# Patient Record
Sex: FEMALE | Race: WHITE | NOT HISPANIC OR LATINO | Employment: UNEMPLOYED | URBAN - METROPOLITAN AREA
[De-identification: names, ages, dates, MRNs, and addresses within clinical notes are randomized per-mention and may not be internally consistent; named-entity substitution may affect disease eponyms.]

---

## 2017-07-31 ENCOUNTER — TELEPHONE (OUTPATIENT)
Dept: NEUROLOGY | Facility: HOSPITAL | Age: 62
End: 2017-07-31

## 2017-07-31 NOTE — TELEPHONE ENCOUNTER
Pt's  request earlier appt.  Appt rescheduled to Monday, August 14, 2017 at 130pm.  Caregiver acknowledged understanding.

## 2017-08-14 ENCOUNTER — OFFICE VISIT (OUTPATIENT)
Dept: NEUROLOGY | Facility: HOSPITAL | Age: 62
End: 2017-08-14
Attending: INTERNAL MEDICINE
Payer: MEDICARE

## 2017-08-14 VITALS
SYSTOLIC BLOOD PRESSURE: 108 MMHG | HEIGHT: 62 IN | RESPIRATION RATE: 16 BRPM | HEART RATE: 85 BPM | TEMPERATURE: 98 F | BODY MASS INDEX: 19.32 KG/M2 | DIASTOLIC BLOOD PRESSURE: 77 MMHG | WEIGHT: 105 LBS

## 2017-08-14 DIAGNOSIS — D50.0 ANEMIA DUE TO CHRONIC BLOOD LOSS: Primary | ICD-10-CM

## 2017-08-14 PROCEDURE — 99214 OFFICE O/P EST MOD 30 MIN: CPT | Performed by: INTERNAL MEDICINE

## 2017-08-14 RX ORDER — TIZANIDINE 4 MG/1
4 TABLET ORAL EVERY 6 HOURS PRN
Status: ON HOLD | COMMUNITY
End: 2019-10-17 | Stop reason: CLARIF

## 2017-08-14 RX ORDER — BUTALBITAL, ACETAMINOPHEN AND CAFFEINE 50; 325; 40 MG/1; MG/1; MG/1
1 CAPSULE ORAL 3 TIMES DAILY
COMMUNITY

## 2017-08-14 RX ORDER — VENLAFAXINE 75 MG/1
75 TABLET ORAL DAILY
Status: ON HOLD | COMMUNITY
End: 2020-11-16 | Stop reason: CLARIF

## 2017-08-14 RX ORDER — PROMETHAZINE HYDROCHLORIDE 25 MG/1
25 TABLET ORAL EVERY 6 HOURS PRN
COMMUNITY

## 2017-08-14 RX ORDER — OXYCODONE AND ACETAMINOPHEN 10; 325 MG/1; MG/1
1 TABLET ORAL EVERY 4 HOURS PRN
Status: ON HOLD | COMMUNITY
End: 2019-10-17 | Stop reason: CLARIF

## 2017-08-14 RX ORDER — CLONAZEPAM 1 MG/1
1 TABLET ORAL 2 TIMES DAILY
Status: ON HOLD | COMMUNITY
End: 2019-10-17 | Stop reason: CLARIF

## 2017-08-14 RX ORDER — PANTOPRAZOLE SODIUM 40 MG/1
40 TABLET, DELAYED RELEASE ORAL DAILY
Status: ON HOLD | COMMUNITY
End: 2019-10-17 | Stop reason: CLARIF

## 2017-08-14 NOTE — PATIENT INSTRUCTIONS
You are scheduled for an upper single balloon test on _Thursday, August 24, 2017_____    You should eat light meals the day before the procedure and nothing to eat or drink after midnight the night before your procedure.    You will need to be at the 1st floor admission desk at the hospital on __and the hospital will call you the night before to tell you when to report for the procedure_____

## 2017-08-14 NOTE — PROGRESS NOTES
"[facility]    CC: anemia     HPI 62 y.o. female with history of mild depression and chronic H/A presents as a referral from Dr. Simeon for a >6 month history of recurrent, severe BRITTANY associated with classic angioectasias ablated in the stomach and duodenum. She denies associated abdominal pain but complains of a 10 lb wt loss during this time period. She is taking iron daily. She has no family history of colon cancer or IBD.     Past Medical History:   Mild depression   Chronic H/A   Iron deficiency anemia     Social History: no MERE abuse   Family History: negative for IBD or CRC     Review of Systems  General ROS: negative for chills, fever; positive weight loss  Psychological ROS: negative for hallucination; positive depression   Ophthalmic ROS: negative for blurry vision, photophobia or eye pain  ENT ROS: negative for epistaxis, sore throat or rhinorrhea  Respiratory ROS: no cough, shortness of breath, or wheezing  Cardiovascular ROS: no chest pain or dyspnea on exertion  Gastrointestinal ROS: no abdominal pain, change in bowel habits, or black/ bloody stools  Genito-Urinary ROS: no dysuria, trouble voiding, or hematuria  Musculoskeletal ROS: negative for gait disturbance or muscular weakness  Neurological ROS: no syncope or seizures; no ataxia  Dermatological ROS: negative for pruritis, rash and jaundice    Physical Examination  /77 (BP Location: Left arm)   Pulse 85   Temp 97.5 °F (36.4 °C) (Oral)   Resp 16   Ht 5' 2" (1.575 m)   Wt 47.6 kg (105 lb)   BMI 19.20 kg/m²   General appearance: alert, cooperative, no distress  HENT: Normocephalic, atraumatic, neck symmetrical, no nasal discharge   Eyes: conjunctivae/corneas clear, PERRL, EOM's intact  Lungs: clear to auscultation bilaterally, no dullness to percussion bilaterally  Heart: regular rate and rhythm without rub; no displacement of the PMI   Abdomen: soft, non-tender; bowel sounds normoactive; no organomegaly  Extremities: extremities " symmetric; no clubbing, cyanosis, or edema  Integument: Skin color, texture, turgor normal; no rashes; hair distrubution normal  Neurologic: Alert and oriented X 3, normal strength, normal coordination and gait  Psychiatric: no pressured speech; normal affect; no evidence of impaired cognition     Labs:  Lab Results   Component Value Date    WBC 7.85 04/20/2009    HGB 12.6 04/20/2009    HCT 39.3 04/20/2009    MCV 99.2 (H) 04/20/2009     04/20/2009     Previous endoscopic images reviewed   Additional history obtained from family     Assessment:   62yrF with a recent diagnosis of BRITTANY associated with classic angioectasias in the duodenum and stomach.     Plan:   Upper single balloon enteroscopy for ablation of any additional angioectasias in the proximal bowel   Continue oral iron once daily indefinitely   Patient referred by Dr. Cailin Hagen MD   [address] [place] [address], [address]   [place] [address] [phone]

## 2017-08-15 ENCOUNTER — TELEPHONE (OUTPATIENT)
Dept: NEUROLOGY | Facility: HOSPITAL | Age: 62
End: 2017-08-15

## 2017-08-15 NOTE — TELEPHONE ENCOUNTER
----- Message from Nina Kaur RN sent at 8/14/2017  4:35 PM CDT -----  Myles Taveras,  Can you please get auth for this patient to get a upper single balloon on 8/24/17 for DX:D50.0 and CPT code 27516.  Thanks,  Elvia

## 2017-08-24 ENCOUNTER — SURGERY (OUTPATIENT)
Age: 62
End: 2017-08-24

## 2017-08-24 ENCOUNTER — HOSPITAL ENCOUNTER (OUTPATIENT)
Facility: HOSPITAL | Age: 62
Discharge: HOME | End: 2017-08-24
Attending: INTERNAL MEDICINE | Admitting: INTERNAL MEDICINE
Payer: MEDICARE

## 2017-08-24 ENCOUNTER — ANESTHESIA (OUTPATIENT)
Dept: ENDOSCOPY | Facility: HOSPITAL | Age: 62
End: 2017-08-24
Payer: MEDICARE

## 2017-08-24 ENCOUNTER — ANESTHESIA EVENT (OUTPATIENT)
Dept: ENDOSCOPY | Facility: HOSPITAL | Age: 62
End: 2017-08-24
Payer: MEDICARE

## 2017-08-24 DIAGNOSIS — D50.0 IRON DEFICIENCY ANEMIA DUE TO CHRONIC BLOOD LOSS: Primary | ICD-10-CM

## 2017-08-24 DIAGNOSIS — D64.9 ANEMIA: ICD-10-CM

## 2017-08-24 PROCEDURE — 27201348 HC SBE SINGLE BALLOON: Performed by: INTERNAL MEDICINE

## 2017-08-24 PROCEDURE — 27201238 HC BALLOON, OVERTUBE (ANY): Performed by: INTERNAL MEDICINE

## 2017-08-24 PROCEDURE — 25000003 PHARM REV CODE 250: Performed by: NURSE ANESTHETIST, CERTIFIED REGISTERED

## 2017-08-24 PROCEDURE — 44378 SMALL BOWEL ENDOSCOPY: CPT | Performed by: INTERNAL MEDICINE

## 2017-08-24 PROCEDURE — 25000003 PHARM REV CODE 250: Performed by: INTERNAL MEDICINE

## 2017-08-24 PROCEDURE — 37000009 HC ANESTHESIA EA ADD 15 MINS: Performed by: INTERNAL MEDICINE

## 2017-08-24 PROCEDURE — 27202087 HC PROBE, APC: Performed by: INTERNAL MEDICINE

## 2017-08-24 PROCEDURE — 63600175 PHARM REV CODE 636 W HCPCS: Performed by: NURSE ANESTHETIST, CERTIFIED REGISTERED

## 2017-08-24 PROCEDURE — 37000008 HC ANESTHESIA 1ST 15 MINUTES: Performed by: INTERNAL MEDICINE

## 2017-08-24 RX ORDER — SODIUM CHLORIDE 9 MG/ML
INJECTION, SOLUTION INTRAVENOUS CONTINUOUS
Status: DISCONTINUED | OUTPATIENT
Start: 2017-08-24 | End: 2017-08-24 | Stop reason: HOSPADM

## 2017-08-24 RX ORDER — PROPOFOL 10 MG/ML
VIAL (ML) INTRAVENOUS CONTINUOUS PRN
Status: DISCONTINUED | OUTPATIENT
Start: 2017-08-24 | End: 2017-08-24

## 2017-08-24 RX ORDER — PROPOFOL 10 MG/ML
VIAL (ML) INTRAVENOUS
Status: DISCONTINUED | OUTPATIENT
Start: 2017-08-24 | End: 2017-08-24

## 2017-08-24 RX ORDER — GLUCAGON 1 MG
KIT INJECTION
Status: DISCONTINUED | OUTPATIENT
Start: 2017-08-24 | End: 2017-08-24

## 2017-08-24 RX ORDER — LIDOCAINE HCL/PF 100 MG/5ML
SYRINGE (ML) INTRAVENOUS
Status: DISCONTINUED | OUTPATIENT
Start: 2017-08-24 | End: 2017-08-24

## 2017-08-24 RX ORDER — FENTANYL CITRATE 50 UG/ML
INJECTION, SOLUTION INTRAMUSCULAR; INTRAVENOUS
Status: DISCONTINUED | OUTPATIENT
Start: 2017-08-24 | End: 2017-08-24

## 2017-08-24 RX ADMIN — TOPICAL ANESTHETIC 1 EACH: 200 SPRAY DENTAL; PERIODONTAL at 08:08

## 2017-08-24 RX ADMIN — SODIUM CHLORIDE: 9 INJECTION, SOLUTION INTRAVENOUS at 07:08

## 2017-08-24 RX ADMIN — PROPOFOL 20 MG: 10 INJECTION, EMULSION INTRAVENOUS at 08:08

## 2017-08-24 RX ADMIN — PROPOFOL 150 MCG/KG/MIN: 10 INJECTION, EMULSION INTRAVENOUS at 08:08

## 2017-08-24 RX ADMIN — FENTANYL CITRATE 25 MCG: 50 INJECTION, SOLUTION INTRAMUSCULAR; INTRAVENOUS at 08:08

## 2017-08-24 RX ADMIN — GLUCAGON HYDROCHLORIDE 1 MG: KIT at 08:08

## 2017-08-24 RX ADMIN — LIDOCAINE HYDROCHLORIDE 50 MG: 20 INJECTION, SOLUTION INTRAVENOUS at 08:08

## 2017-08-24 NOTE — ANESTHESIA POSTPROCEDURE EVALUATION
"Anesthesia Post Evaluation    Patient: Gretchen Ahmadi    Procedure(s) Performed: Procedure(s) (LRB):  Upper single balloon (N/A)    Final Anesthesia Type: MAC  Patient location during evaluation: GI PACU  Patient participation: Yes- Able to Participate  Level of consciousness: awake and alert and oriented  Post-procedure vital signs: reviewed and stable  Pain management: adequate  Airway patency: patent  PONV status at discharge: No PONV  Anesthetic complications: no      Cardiovascular status: blood pressure returned to baseline and hemodynamically stable  Respiratory status: unassisted, spontaneous ventilation and room air  Hydration status: euvolemic  Follow-up not needed.        Visit Vitals  /79 (BP Location: Right arm, Patient Position: Lying)   Pulse 96   Temp 36.8 °C (98.2 °F) (Oral)   Resp 18   Ht 5' 2" (1.575 m)   Wt 47.6 kg (105 lb)   SpO2 95%   Breastfeeding? No   BMI 19.20 kg/m²       Pain/Tyrese Score: Pain Assessment Performed: Yes (8/24/2017  7:42 AM)  Presence of Pain: complains of pain/discomfort (8/24/2017  7:42 AM)      "

## 2017-08-24 NOTE — TRANSFER OF CARE
"Anesthesia Transfer of Care Note    Patient: Gretchen Ahmadi    Procedure(s) Performed: Procedure(s) (LRB):  Upper single balloon (N/A)    Patient location: GI    Anesthesia Type: MAC    Transport from OR: Transported from OR on room air with adequate spontaneous ventilation    Post pain: adequate analgesia    Post assessment: no apparent anesthetic complications    Post vital signs: stable    Level of consciousness: awake    Nausea/Vomiting: no nausea/vomiting    Complications: none    Transfer of care protocol was followed      Last vitals:   Visit Vitals  /79 (BP Location: Right arm, Patient Position: Lying)   Pulse 96   Temp 36.8 °C (98.2 °F) (Oral)   Resp 18   Ht 5' 2" (1.575 m)   Wt 47.6 kg (105 lb)   SpO2 95%   Breastfeeding? No   BMI 19.20 kg/m²     "

## 2017-08-24 NOTE — DISCHARGE INSTRUCTIONS
Post Small Bowel Enteroscopy (Antegrade) Discharge Instructions:    Gretchen Ahmadi  8/24/2017  Sudheer Hagen MD    1. Do Not eat or drink anything for 1 hour. Try sips of water first. If tolerated, resume your regular diet or one recommended by your physician.  2. Do not drive a car or operate machinery, make critical decisions, or do activities that require coordination or balance for 24 hours.  3. You may experience a sore throat for 24 to 48 hours. You may use throat lozenges or gargle with warm, salt water to relieve the discomfort.  4. Because air was put into your stomach/bowel during the procedure, you may experience some belching.  5. Go directly to the emergency room if you notice any of the following:                              Chills and/or fever over 101                Persistent vomiting or vomiting with blood/nasal regurgitation                Severe abdominal pain, other than gas cramps                Severe chest pain                Black, tarry stools    If you have any questions or problems, please call your Physician:    Sudheer Hagen MD    Lab Results: [phone]    If a complication or emergency situation arises and you are unable to reach your Physician - GO TO THE EMERGENCY ROOM.

## 2017-08-24 NOTE — PLAN OF CARE
Dr phelps at bedside speaking to pt with all questions addressed with discharge instructions given with pt and  verb und.

## 2017-08-24 NOTE — ANESTHESIA PREPROCEDURE EVALUATION
08/24/2017  Gretchen Ahmadi is a 62 y.o., female for [facility]     Review of patient's allergies indicates:   Allergen Reactions    Zofran [ondansetron hcl (pf)] Hives and Itching     No past medical history on file.  No past surgical history on file.    Patient Active Problem List   Diagnosis    Anemia         Anesthesia Evaluation    I have reviewed the Patient Summary Reports.        Review of Systems    Wt Readings from Last 3 Encounters:   08/14/17 47.6 kg (105 lb)   02/17/10 48.2 kg (106 lb 3.5 oz)     Temp Readings from Last 3 Encounters:   08/14/17 36.4 °C (97.5 °F) (Oral)     BP Readings from Last 3 Encounters:   08/14/17 108/77   02/17/10 125/85     Pulse Readings from Last 3 Encounters:   08/14/17 85   02/17/10 78     Lab Results   Component Value Date    WBC 7.85 04/20/2009    HGB 12.6 04/20/2009    HCT 39.3 04/20/2009    MCV 99.2 (H) 04/20/2009     04/20/2009         Physical Exam  General:  Well nourished    Airway/Jaw/Neck:  Airway Findings: Mouth Opening: Normal Tongue: Normal  General Airway Assessment: Adult  Mallampati: II  Improves to II with phonation.  TM Distance: Normal, at least 6 cm       Chest/Lungs:  Chest/Lungs Findings: Clear to auscultation, Normal Respiratory Rate     Heart/Vascular:  Heart Findings: Rate: Normal  Rhythm: Regular Rhythm  Sounds: Normal        Mental Status:  Mental Status Findings:  Cooperative, Alert and Oriented         Anesthesia Plan  Type of Anesthesia, risks & benefits discussed:  Anesthesia Type:  MAC  Patient's Preference:   Intra-op Monitoring Plan:   Intra-op Monitoring Plan Comments:   Post Op Pain Control Plan:   Post Op Pain Control Plan Comments:   Induction:   IV  Beta Blocker:         Informed Consent: Patient understands risks and agrees with Anesthesia plan.  Questions answered. Anesthesia consent signed with patient.  ASA Score: 2      Day of Surgery Review of History & Physical: I have interviewed and examined the patient. I have reviewed the patient's H&P dated:  There are no significant changes.  H&P update referred to the provider.  H&P completed by Anesthesiologist.       Ready For Surgery From Anesthesia Perspective.

## 2017-08-24 NOTE — H&P
"[facility]     CC: anemia      HPI 62 y.o. female with history of mild depression and chronic H/A presents as a referral from Dr. Simeon for a >6 month history of recurrent, severe BRITTANY associated with classic angioectasias ablated in the stomach and duodenum. She denies associated abdominal pain but complains of a 10 lb wt loss during this time period. She is taking iron daily. She has no family history of colon cancer or IBD.      Past Medical History:   Mild depression   Chronic H/A   Iron deficiency anemia      Social History: no MERE abuse   Family History: negative for IBD or CRC      Review of Systems  General ROS: negative for chills, fever; positive weight loss  Psychological ROS: negative for hallucination; positive depression   Ophthalmic ROS: negative for blurry vision, photophobia or eye pain  ENT ROS: negative for epistaxis, sore throat or rhinorrhea  Respiratory ROS: no cough, shortness of breath, or wheezing  Cardiovascular ROS: no chest pain or dyspnea on exertion  Gastrointestinal ROS: no abdominal pain, change in bowel habits, or black/ bloody stools  Genito-Urinary ROS: no dysuria, trouble voiding, or hematuria  Musculoskeletal ROS: negative for gait disturbance or muscular weakness  Neurological ROS: no syncope or seizures; no ataxia  Dermatological ROS: negative for pruritis, rash and jaundice     Physical Examination  /77 (BP Location: Left arm)   Pulse 85   Temp 97.5 °F (36.4 °C) (Oral)   Resp 16   Ht 5' 2" (1.575 m)   Wt 47.6 kg (105 lb)   BMI 19.20 kg/m²   General appearance: alert, cooperative, no distress  HENT: Normocephalic, atraumatic, neck symmetrical, no nasal discharge   Eyes: conjunctivae/corneas clear, PERRL, EOM's intact  Lungs: clear to auscultation bilaterally, no dullness to percussion bilaterally  Heart: regular rate and rhythm without rub; no displacement of the PMI   Abdomen: soft, non-tender; bowel sounds normoactive; no organomegaly  Extremities: " extremities symmetric; no clubbing, cyanosis, or edema  Integument: Skin color, texture, turgor normal; no rashes; hair distrubution normal  Neurologic: Alert and oriented X 3, normal strength, normal coordination and gait  Psychiatric: no pressured speech; normal affect; no evidence of impaired cognition      Labs:        Lab Results   Component Value Date     WBC 7.85 04/20/2009     HGB 12.6 04/20/2009     HCT 39.3 04/20/2009     MCV 99.2 (H) 04/20/2009      04/20/2009      Previous endoscopic images reviewed   Additional history obtained from family      Assessment:   62yrF with a recent diagnosis of BRITTANY associated with classic angioectasias in the duodenum and stomach.      Plan:   Upper single balloon enteroscopy for ablation of any additional angioectasias in the proximal bowel   Continue oral iron once daily indefinitely   Patient referred by Dr. Cailin Hagen MD   [address] [facility], [address]   [place] [address] [phone]

## 2017-08-25 VITALS
RESPIRATION RATE: 18 BRPM | SYSTOLIC BLOOD PRESSURE: 126 MMHG | DIASTOLIC BLOOD PRESSURE: 69 MMHG | BODY MASS INDEX: 19.32 KG/M2 | HEIGHT: 62 IN | WEIGHT: 105 LBS | HEART RATE: 83 BPM | OXYGEN SATURATION: 94 % | TEMPERATURE: 98 F

## 2018-05-14 ENCOUNTER — OFFICE VISIT (OUTPATIENT)
Dept: NEUROLOGY | Facility: HOSPITAL | Age: 63
End: 2018-05-14
Attending: INTERNAL MEDICINE
Payer: MEDICARE

## 2018-05-14 VITALS
SYSTOLIC BLOOD PRESSURE: 110 MMHG | BODY MASS INDEX: 20.83 KG/M2 | WEIGHT: 113.19 LBS | HEIGHT: 62 IN | HEART RATE: 82 BPM | DIASTOLIC BLOOD PRESSURE: 76 MMHG | TEMPERATURE: 98 F

## 2018-05-14 DIAGNOSIS — D50.0 ANEMIA DUE TO CHRONIC BLOOD LOSS: Primary | ICD-10-CM

## 2018-05-14 PROCEDURE — 99214 OFFICE O/P EST MOD 30 MIN: CPT | Performed by: INTERNAL MEDICINE

## 2018-05-14 NOTE — PROGRESS NOTES
"  [facility]    CC: anemia    HPI 63 y.o. female with history of mild depression and chronic H/A presents for follow up of chronic, recurrent, severe iron deficiency anemia associated with classic angioectasias ablated in the stomach and duodenum. She is taking oral iron daily. She has not required PRBC transfusion since her most recent DBE procedure but just recently received a dose of parental iron.     Past Medical History:   Diagnosis Date    Arthritis     Encounter for blood transfusion      Review of Systems  General ROS: negative for chills, fever or weight loss  Cardiovascular ROS: no chest pain or dyspnea on exertion  Gastrointestinal ROS: no abdominal pain, change in bowel habits, or black/ bloody stools    Physical Examination  /76   Pulse 82   Temp 98.4 °F (36.9 °C) (Oral)   Ht 5' 2" (1.575 m)   Wt 51.4 kg (113 lb 3.3 oz)   BMI 20.71 kg/m²   General appearance: alert, cooperative, no distress  HENT: Normocephalic, atraumatic, neck symmetrical, no nasal discharge   Lungs: clear to auscultation bilaterally, no dullness to percussion bilaterally  Heart: regular rate and rhythm without rub; no displacement of the PMI   Abdomen: soft, non-tender; bowel sounds normoactive; no organomegaly  Extremities: extremities symmetric; no clubbing, cyanosis, or edema  Neurologic: Alert and oriented X 3, normal strength, normal coordination and gait    Assessment:   62yrF with a recent diagnosis of BRITTANY associated with classic angioectasias in the duodenum and stomach. I ablated multiple classic angioectasias by upper DBE 10 months ago. Her Hb dropped from 12 to 10 recently but she just recently received a dose of Injectofer 750mg   Plan:   Continue with 2nd Injectofer injection next week.   If Hb is not better than 10 by CBC in 4 weeks then I will direct schedule for a 2nd upper DBE for a 2nd session of ablation         Sudheer Hagen MD   [address] [facility], [address]   [place] [address] [phone]

## 2018-05-22 LAB
EXT 24 HR UR METANEPHRINE: ABNORMAL
EXT 24 HR UR NORMETANEPHRINE: ABNORMAL
EXT 24 HR UR NORMETANEPHRINE: ABNORMAL
EXT 25 HYDROXY VIT D2: ABNORMAL
EXT 25 HYDROXY VIT D3: ABNORMAL
EXT 5 HIAA 24 HR URINE: ABNORMAL
EXT 5 HIAA BLOOD: ABNORMAL
EXT ACTH: ABNORMAL
EXT AFP: ABNORMAL
EXT ALBUMIN: ABNORMAL
EXT ALKALINE PHOSPHATASE: ABNORMAL
EXT ALT: ABNORMAL
EXT AMYLASE: ABNORMAL
EXT ANTI ISLET CELL AB: ABNORMAL
EXT ANTI PARIETAL CELL AB: ABNORMAL
EXT ANTI THYROID AB: ABNORMAL
EXT AST: ABNORMAL
EXT BILIRUBIN DIRECT: ABNORMAL MG/DL
EXT BILIRUBIN TOTAL: ABNORMAL
EXT BK VIRUS DNA QN PCR: ABNORMAL
EXT BUN: ABNORMAL
EXT C PEPTIDE: ABNORMAL
EXT CA 125: ABNORMAL
EXT CA 19-9: ABNORMAL
EXT CA 27-29: ABNORMAL
EXT CALCITONIN: ABNORMAL
EXT CALCIUM: ABNORMAL
EXT CEA: ABNORMAL
EXT CHLORIDE: ABNORMAL
EXT CHOLESTEROL: ABNORMAL
EXT CHROMOGRANIN A: ABNORMAL
EXT CO2: ABNORMAL
EXT CREATININE UA: ABNORMAL
EXT CREATININE: ABNORMAL MG/DL
EXT CYCLOSPORONE LEVEL: ABNORMAL
EXT DOPAMINE: ABNORMAL
EXT EBV DNA BY PCR: ABNORMAL
EXT EPINEPHRINE: ABNORMAL
EXT FOLATE: ABNORMAL
EXT FREE T3: ABNORMAL
EXT FREE T4: ABNORMAL
EXT FSH: ABNORMAL
EXT GASTRIN RELEASING PEPTIDE: ABNORMAL
EXT GASTRIN RELEASING PEPTIDE: ABNORMAL
EXT GASTRIN: ABNORMAL
EXT GGT: ABNORMAL
EXT GHRELIN: ABNORMAL
EXT GLUCAGON: ABNORMAL
EXT GLUCOSE: ABNORMAL
EXT GROWTH HORMONE: ABNORMAL
EXT HCV RNA QUANT PCR: ABNORMAL
EXT HDL: ABNORMAL
EXT HEMATOCRIT: 40.7 % (ref 37–47)
EXT HEMOGLOBIN A1C: ABNORMAL
EXT HEMOGLOBIN: 11.8 GM/DL (ref 12–16)
EXT HISTAMINE 24 HR URINE: ABNORMAL
EXT HISTAMINE: ABNORMAL
EXT IGF-1: ABNORMAL
EXT IMMUNKNOW (NON-STIMULATED): ABNORMAL
EXT IMMUNKNOW (STIMULATED): ABNORMAL
EXT INR: ABNORMAL
EXT INSULIN: ABNORMAL
EXT LANREOTIDE LEVEL: ABNORMAL
EXT LDH, TOTAL: ABNORMAL
EXT LDL CHOLESTEROL: ABNORMAL
EXT LIPASE: ABNORMAL
EXT MAGNESIUM: ABNORMAL
EXT METANEPHRINE FREE PLASMA: ABNORMAL
EXT MOTILIN: ABNORMAL
EXT NEUROKININ A CAMB: ABNORMAL
EXT NEUROKININ A ISI: ABNORMAL
EXT NEUROTENSIN: ABNORMAL
EXT NOREPINEPHRINE: ABNORMAL
EXT NORMETANEPHRINE: ABNORMAL
EXT NSE: ABNORMAL
EXT OCTREOTIDE LEVEL: ABNORMAL
EXT PANCREASTATIN CAMB: ABNORMAL
EXT PANCREASTATIN ISI: ABNORMAL
EXT PANCREATIC POLYPEPTIDE: ABNORMAL
EXT PHOSPHORUS: ABNORMAL
EXT PLATELETS: 222 1000/UL (ref 150–375)
EXT POTASSIUM: ABNORMAL
EXT PROGRAF LEVEL: ABNORMAL
EXT PROLACTIN: ABNORMAL
EXT PROTEIN TOTAL: ABNORMAL
EXT PROTEIN UA: ABNORMAL
EXT PT: ABNORMAL
EXT PTH, INTACT: ABNORMAL
EXT PTT: ABNORMAL
EXT RAPAMUNE LEVEL: ABNORMAL
EXT SEROTONIN: ABNORMAL
EXT SODIUM: ABNORMAL MMOL/L
EXT SOMATOSTATIN: ABNORMAL
EXT SUBSTANCE P: ABNORMAL
EXT TRIGLYCERIDES: ABNORMAL
EXT TRYPTASE: ABNORMAL
EXT TSH: ABNORMAL
EXT URIC ACID: ABNORMAL
EXT URINE AMYLASE U/HR: ABNORMAL
EXT URINE AMYLASE U/L: ABNORMAL
EXT VASOACTIVE INTESTINAL POLYPEPTIDE: ABNORMAL
EXT VITAMIN B12: ABNORMAL
EXT VMA 24 HR URINE: ABNORMAL
EXT WBC: 7.8 1000/UL (ref 4–11)
NEURON SPECIFIC ENOLASE: ABNORMAL

## 2019-09-12 ENCOUNTER — TELEPHONE (OUTPATIENT)
Dept: NEUROLOGY | Facility: HOSPITAL | Age: 64
End: 2019-09-12

## 2019-09-12 NOTE — TELEPHONE ENCOUNTER
Spoke with pt schedule clinic appt for 09/1819 @10:30am. Mailed reminder. Confirmed address. Pt voices understanding.

## 2019-09-18 ENCOUNTER — OFFICE VISIT (OUTPATIENT)
Dept: NEUROLOGY | Facility: HOSPITAL | Age: 64
End: 2019-09-18
Attending: INTERNAL MEDICINE
Payer: MEDICARE

## 2019-09-18 VITALS
TEMPERATURE: 98 F | HEIGHT: 62 IN | DIASTOLIC BLOOD PRESSURE: 77 MMHG | BODY MASS INDEX: 20.16 KG/M2 | HEART RATE: 94 BPM | SYSTOLIC BLOOD PRESSURE: 107 MMHG | WEIGHT: 109.56 LBS

## 2019-09-18 DIAGNOSIS — D50.0 ANEMIA DUE TO CHRONIC BLOOD LOSS: Primary | ICD-10-CM

## 2019-09-18 PROCEDURE — 99214 OFFICE O/P EST MOD 30 MIN: CPT | Performed by: INTERNAL MEDICINE

## 2019-09-18 RX ORDER — CYCLOBENZAPRINE HCL 10 MG
TABLET ORAL
Status: ON HOLD | COMMUNITY
Start: 2019-08-29 | End: 2020-11-16 | Stop reason: CLARIF

## 2019-09-18 RX ORDER — SODIUM CHLORIDE 0.9 % (FLUSH) 0.9 %
10 SYRINGE (ML) INJECTION
Status: CANCELLED | OUTPATIENT
Start: 2019-09-18

## 2019-09-18 NOTE — PROGRESS NOTES
[facility]    CC: Anemia due to chronic blood loss    HPI 64 y.o. female with history of chronic headache and chronic back pain who presents with chronic, recurrent, severe iron deficiency anemia associated with classic angiectasias ablated in the stomach and duodenum 2 years ago. She has routine CBCs performed every 3 months to monitor her chronic blood loss. Her last 2 CBCs showed a drop in Hgb from 12.9 in May 2019 to 10.5 last week. She was referred here for balloon enteroscopy evaluation of small bowel bleed. She last received IV iron in May and takes oral iron pills daily. She has not required a PRBC transfusion since 2 years ago.      The patient endorses generalized fatigue and 4 pound weight loss in the last 3 months. She notes she might have a dark stool every once in a while, but it is never black or tarry. She periodically has blood when she wipes after a bowel movement (once a month), but never any velia blood in her stool. She denies diarrhea, constipation, syncope, loss of appetite, and abdominal pain. She does not take NSAIDS or blood thinners.    Her last EGD was done in [place] [facility] on 6/10/2019 and showed no significant pathologic findings. Her last colonoscopy was done in [place] [facility] a year ago and showed "very little polyps". An upper single balloon enteroscopy performed by Dr. Hagen on 8/24/2017 showed multiple angiectasias of the stomach and duodenum which were ablated. Her Hgb levels were stable until this last CBC per her.     She has family medical history of colon cancer in her maternal uncle.    Her  was present in the room with her during the visit.    Past Medical History:   Diagnosis Date    Arthritis     Encounter for blood transfusion          Past Surgical History:   Procedure Laterality Date    BACK SURGERY      CARPAL TUNNEL RELEASE      CHOLECYSTECTOMY      HYSTERECTOMY      JOINT REPLACEMENT      Upper single balloon N/A 8/24/2017    Performed by Sudheer" "ANURAG Hagen MD at [facility]       Social History  Social History     Tobacco Use    Smoking status: Current Every Day Smoker     Packs/day: 1.50    Smokeless tobacco: Never Used   Substance Use Topics    Alcohol use: No    Drug use: No   Tobacco: 1.5 PPD for 48 years  EtOH- denies  Illicit drug use: Denies use of marijuana, IV drugs, and marijuana      Family Medical History:  Mom: CHF and DM  Brother: Heart transplant (unable to elaborate)  Maternal uncle: colon cancer    Review of Systems  General ROS: negative for chills and fever. Positive for 4 pound weight loss over 3 months  Psychological ROS: negative for hallucination, depression or suicidal ideation  Ophthalmic ROS: negative for blurry vision, photophobia or eye pain  ENT ROS: negative for epistaxis, sore throat or rhinorrhea  Respiratory ROS: no cough, shortness of breath, or wheezing  Cardiovascular ROS: no chest pain or dyspnea on exertion  Gastrointestinal ROS: no abdominal pain or change in bowel habits. Possible rare episodes of melena and hematochezia (<1 per month)  Genito-Urinary ROS: no dysuria, trouble voiding, or hematuria  Musculoskeletal ROS: negative for gait disturbance or muscular weakness  Neurological ROS: no syncope or seizures; no ataxia  Dermatological ROS: negative for pruritis, rash and jaundice    Physical Examination  /77 (BP Location: Right arm, Patient Position: Sitting, BP Method: Medium (Automatic))   Pulse 94   Temp 98 °F (36.7 °C) (Oral)   Ht 5' 2" (1.575 m)   Wt 49.7 kg (109 lb 9.1 oz)   BMI 20.04 kg/m²   General appearance: alert, cooperative, no distress  HENT: Normocephalic, atraumatic, neck symmetrical, no nasal discharge   Eyes: conjunctivae/corneas clear, PERRL, EOM's intact  Lungs: clear to auscultation bilaterally, no dullness to percussion bilaterally  Heart: regular rate and rhythm without rub; no displacement of the PMI   Abdomen: soft, non-tender; bowel sounds normoactive; no " organomegaly  Extremities: extremities symmetric; no clubbing, cyanosis, or edema  Integument: Skin color, texture, turgor normal; no rashes; hair distrubution normal  Neurologic: Alert and oriented X 3, normal strength, normal coordination and gait. No focal neurologic deficits.  Psychiatric: no pressured speech; normal affect; no evidence of impaired cognition     Labs: Personally reviewed  (Performed on 9/4/2019)    WBC 7.0 / Hgb 10.5 / Hct 34.5 /  Platelet 230 /      Ferritin 18.8 / TIBC 234 / Iron Sat 34% / Iron level 79 / Folate >20 (H) / Vitamin B12 621       Assessment:   Mrs. Ahmadi is a 64 y.o. female with history of chronic headache and chronic back pain who presents with chronic, recurrent, severe iron deficiency anemia associated with classic angiectasias ablated in the stomach, duodenum and jejunum by me 2 years ago.     Plan:   - We discussed that her angiectasias are most likely the cause of her chronic blood loss. It was stressed that this is a chronic problem treated symptomatically with ablation and iron supplementation. If her symptoms acutely worsen despite this treatment we will look into other options such as octreotide. The balloon enteroscopy risk and benefits were discussed. All of her questions were answered and she decided to undergo the procedure.  - Upper balloon (single vs double but prefer double) enteroscopy performed at [facility] on October 17th, 2019.   - 2 doses of injectafer ordered and to be completed before her procedure  - Patient of Dr. Cailin Hagen MD   [address] [place] [address], [address]   [place] [address] [phone]

## 2019-09-18 NOTE — PATIENT INSTRUCTIONS
You are scheduled for an Upper SBE on __Thursday, October 17, 2019_______________________________    You should eat light meals the day before the procedure and nothing to eat or drink after midnight the night before your procedure.    You will need to be at the 1st floor admission desk at the hospital on ____Endoscopy staff will contact with arrival time, 2 days prior to procedure.    Anesthesia will be given , you will not be able to drive for 12 hours after procedure.____________________    You will be contacted by [facility] to schedule Injectafar times two.

## 2019-09-20 ENCOUNTER — INFUSION (OUTPATIENT)
Dept: INFUSION THERAPY | Facility: HOSPITAL | Age: 64
End: 2019-09-20
Attending: INTERNAL MEDICINE
Payer: MEDICARE

## 2019-09-20 VITALS
HEIGHT: 62 IN | WEIGHT: 109.56 LBS | OXYGEN SATURATION: 95 % | HEART RATE: 104 BPM | TEMPERATURE: 98 F | BODY MASS INDEX: 20.16 KG/M2 | SYSTOLIC BLOOD PRESSURE: 123 MMHG | DIASTOLIC BLOOD PRESSURE: 76 MMHG | RESPIRATION RATE: 18 BRPM

## 2019-09-20 DIAGNOSIS — D50.0 IRON DEFICIENCY ANEMIA DUE TO CHRONIC BLOOD LOSS: Primary | ICD-10-CM

## 2019-09-20 PROCEDURE — 63600175 PHARM REV CODE 636 W HCPCS: Performed by: INTERNAL MEDICINE

## 2019-09-20 PROCEDURE — 96365 THER/PROPH/DIAG IV INF INIT: CPT

## 2019-09-20 RX ORDER — SODIUM CHLORIDE 0.9 % (FLUSH) 0.9 %
10 SYRINGE (ML) INJECTION
Status: CANCELLED | OUTPATIENT
Start: 2019-09-27

## 2019-09-20 RX ORDER — SODIUM CHLORIDE 0.9 % (FLUSH) 0.9 %
10 SYRINGE (ML) INJECTION
Status: DISCONTINUED | OUTPATIENT
Start: 2019-09-20 | End: 2019-09-20 | Stop reason: HOSPADM

## 2019-09-20 RX ADMIN — SODIUM CHLORIDE: 0.9 INJECTION, SOLUTION INTRAVENOUS at 09:09

## 2019-09-20 RX ADMIN — FERRIC CARBOXYMALTOSE INJECTION 750 MG: 50 INJECTION, SOLUTION INTRAVENOUS at 09:09

## 2019-09-27 ENCOUNTER — INFUSION (OUTPATIENT)
Dept: INFUSION THERAPY | Facility: HOSPITAL | Age: 64
End: 2019-09-27
Attending: INTERNAL MEDICINE
Payer: MEDICARE

## 2019-09-27 VITALS
WEIGHT: 109.56 LBS | DIASTOLIC BLOOD PRESSURE: 83 MMHG | SYSTOLIC BLOOD PRESSURE: 131 MMHG | TEMPERATURE: 98 F | HEART RATE: 91 BPM | OXYGEN SATURATION: 95 % | HEIGHT: 62 IN | BODY MASS INDEX: 20.16 KG/M2 | RESPIRATION RATE: 18 BRPM

## 2019-09-27 DIAGNOSIS — D50.0 IRON DEFICIENCY ANEMIA DUE TO CHRONIC BLOOD LOSS: Primary | ICD-10-CM

## 2019-09-27 PROCEDURE — 63600175 PHARM REV CODE 636 W HCPCS: Mod: JG | Performed by: INTERNAL MEDICINE

## 2019-09-27 PROCEDURE — 96365 THER/PROPH/DIAG IV INF INIT: CPT

## 2019-09-27 RX ORDER — SODIUM CHLORIDE 0.9 % (FLUSH) 0.9 %
10 SYRINGE (ML) INJECTION
Status: DISCONTINUED | OUTPATIENT
Start: 2019-09-27 | End: 2019-09-27 | Stop reason: HOSPADM

## 2019-09-27 RX ORDER — BUPRENORPHINE HCL 450 MCG
450 FILM, MEDICATED (EA) BUCCAL 2 TIMES DAILY
Status: ON HOLD | COMMUNITY
End: 2020-11-16 | Stop reason: CLARIF

## 2019-09-27 RX ORDER — SODIUM CHLORIDE 0.9 % (FLUSH) 0.9 %
10 SYRINGE (ML) INJECTION
Status: CANCELLED | OUTPATIENT
Start: 2019-09-27

## 2019-09-27 RX ADMIN — FERRIC CARBOXYMALTOSE INJECTION 750 MG: 50 INJECTION, SOLUTION INTRAVENOUS at 10:09

## 2019-09-27 RX ADMIN — SODIUM CHLORIDE: 0.9 INJECTION, SOLUTION INTRAVENOUS at 10:09

## 2019-09-27 NOTE — NURSING
Pt tolerated infusion well. No adverse reaction noted. Pt education reinforced on _Injectafer__ , side effects, what to expect, and when to call Dr. Hagen__. Pt verbalized understanding. I reviewed pt calendar w/ pt and understanding verbalized. IV flushed w/ NS and D/C per protocol.

## 2019-10-15 ENCOUNTER — TELEPHONE (OUTPATIENT)
Dept: ENDOSCOPY | Facility: HOSPITAL | Age: 64
End: 2019-10-15

## 2019-10-15 NOTE — TELEPHONE ENCOUNTER
Spoke with patient about arrival time @ 0800     NPO status reviewed: Patient must have nothing to eat or drink after midnight wed night.     Medications: Do not take Insulin or oral diabetic medications the day of the procedure.  Take as prescribed: heart, seizure and blood pressure medication in the morning with a sip of water (less than an ounce).  Take any breathing medications and bring inhalers to hospital with you Leave all valuables and jewelry at home.     Wear comfortable clothes to procedure to change into hospital gown You cannot drive for 24 hours after your procedure because you will receive sedation for your procedure to make you comfortable.  A ride must be provided at discharge.

## 2019-10-16 ENCOUNTER — ANESTHESIA EVENT (OUTPATIENT)
Dept: ENDOSCOPY | Facility: HOSPITAL | Age: 64
End: 2019-10-16
Payer: MEDICARE

## 2019-10-17 ENCOUNTER — ANESTHESIA (OUTPATIENT)
Dept: ENDOSCOPY | Facility: HOSPITAL | Age: 64
End: 2019-10-17
Payer: MEDICARE

## 2019-10-17 ENCOUNTER — HOSPITAL ENCOUNTER (OUTPATIENT)
Facility: HOSPITAL | Age: 64
Discharge: HOME | End: 2019-10-17
Attending: INTERNAL MEDICINE | Admitting: INTERNAL MEDICINE
Payer: MEDICARE

## 2019-10-17 DIAGNOSIS — D64.9 ANEMIA: ICD-10-CM

## 2019-10-17 DIAGNOSIS — D50.0 IRON DEFICIENCY ANEMIA DUE TO CHRONIC BLOOD LOSS: Primary | ICD-10-CM

## 2019-10-17 PROCEDURE — 63600175 PHARM REV CODE 636 W HCPCS: Performed by: NURSE ANESTHETIST, CERTIFIED REGISTERED

## 2019-10-17 PROCEDURE — 63600175 PHARM REV CODE 636 W HCPCS: Performed by: ANESTHESIOLOGY

## 2019-10-17 PROCEDURE — 27202087 HC PROBE, APC: Performed by: INTERNAL MEDICINE

## 2019-10-17 PROCEDURE — 37000008 HC ANESTHESIA 1ST 15 MINUTES: Performed by: INTERNAL MEDICINE

## 2019-10-17 PROCEDURE — 25000003 PHARM REV CODE 250: Performed by: NURSE ANESTHETIST, CERTIFIED REGISTERED

## 2019-10-17 PROCEDURE — 63600175 PHARM REV CODE 636 W HCPCS: Performed by: INTERNAL MEDICINE

## 2019-10-17 PROCEDURE — 44378 SMALL BOWEL ENDOSCOPY: CPT | Performed by: INTERNAL MEDICINE

## 2019-10-17 PROCEDURE — 37000009 HC ANESTHESIA EA ADD 15 MINS: Performed by: INTERNAL MEDICINE

## 2019-10-17 PROCEDURE — 27201238 HC BALLOON, OVERTUBE (ANY): Performed by: INTERNAL MEDICINE

## 2019-10-17 RX ORDER — MIDAZOLAM HYDROCHLORIDE 1 MG/ML
INJECTION, SOLUTION INTRAMUSCULAR; INTRAVENOUS
Status: DISCONTINUED | OUTPATIENT
Start: 2019-10-17 | End: 2019-10-17

## 2019-10-17 RX ORDER — PHENYLEPHRINE HYDROCHLORIDE 10 MG/ML
INJECTION INTRAVENOUS
Status: DISCONTINUED | OUTPATIENT
Start: 2019-10-17 | End: 2019-10-17

## 2019-10-17 RX ORDER — ROCURONIUM BROMIDE 10 MG/ML
INJECTION, SOLUTION INTRAVENOUS
Status: DISCONTINUED | OUTPATIENT
Start: 2019-10-17 | End: 2019-10-17

## 2019-10-17 RX ORDER — SODIUM CHLORIDE 9 MG/ML
INJECTION, SOLUTION INTRAVENOUS CONTINUOUS
Status: DISCONTINUED | OUTPATIENT
Start: 2019-10-17 | End: 2019-10-17 | Stop reason: HOSPADM

## 2019-10-17 RX ORDER — PROPOFOL 10 MG/ML
VIAL (ML) INTRAVENOUS
Status: DISCONTINUED | OUTPATIENT
Start: 2019-10-17 | End: 2019-10-17

## 2019-10-17 RX ORDER — ONDANSETRON 2 MG/ML
INJECTION INTRAMUSCULAR; INTRAVENOUS
Status: DISCONTINUED
Start: 2019-10-17 | End: 2019-10-17 | Stop reason: WASHOUT

## 2019-10-17 RX ORDER — SODIUM CHLORIDE 9 MG/ML
INJECTION, SOLUTION INTRAVENOUS CONTINUOUS PRN
Status: DISCONTINUED | OUTPATIENT
Start: 2019-10-17 | End: 2019-10-17

## 2019-10-17 RX ORDER — FENTANYL CITRATE 50 UG/ML
INJECTION, SOLUTION INTRAMUSCULAR; INTRAVENOUS
Status: DISCONTINUED | OUTPATIENT
Start: 2019-10-17 | End: 2019-10-17

## 2019-10-17 RX ORDER — HYDROMORPHONE HYDROCHLORIDE 2 MG/ML
0.25 INJECTION, SOLUTION INTRAMUSCULAR; INTRAVENOUS; SUBCUTANEOUS EVERY 5 MIN PRN
Status: DISCONTINUED | OUTPATIENT
Start: 2019-10-17 | End: 2019-10-17 | Stop reason: HOSPADM

## 2019-10-17 RX ORDER — SODIUM CHLORIDE 0.9 % (FLUSH) 0.9 %
10 SYRINGE (ML) INJECTION
Status: DISCONTINUED | OUTPATIENT
Start: 2019-10-17 | End: 2019-10-17 | Stop reason: HOSPADM

## 2019-10-17 RX ORDER — SODIUM CHLORIDE 0.9 % (FLUSH) 0.9 %
3 SYRINGE (ML) INJECTION EVERY 8 HOURS
Status: DISCONTINUED | OUTPATIENT
Start: 2019-10-17 | End: 2019-10-17 | Stop reason: HOSPADM

## 2019-10-17 RX ORDER — LIDOCAINE HCL/PF 100 MG/5ML
SYRINGE (ML) INTRAVENOUS
Status: DISCONTINUED | OUTPATIENT
Start: 2019-10-17 | End: 2019-10-17

## 2019-10-17 RX ORDER — ACETAMINOPHEN 10 MG/ML
1000 INJECTION, SOLUTION INTRAVENOUS ONCE
Status: DISCONTINUED | OUTPATIENT
Start: 2019-10-17 | End: 2019-10-17 | Stop reason: HOSPADM

## 2019-10-17 RX ORDER — SUCCINYLCHOLINE CHLORIDE 20 MG/ML
INJECTION INTRAMUSCULAR; INTRAVENOUS
Status: DISCONTINUED | OUTPATIENT
Start: 2019-10-17 | End: 2019-10-17

## 2019-10-17 RX ADMIN — ROCURONIUM BROMIDE 5 MG: 10 INJECTION, SOLUTION INTRAVENOUS at 09:10

## 2019-10-17 RX ADMIN — SUCCINYLCHOLINE CHLORIDE 100 MG: 20 INJECTION, SOLUTION INTRAMUSCULAR; INTRAVENOUS at 09:10

## 2019-10-17 RX ADMIN — FENTANYL CITRATE 100 MCG: 50 INJECTION, SOLUTION INTRAMUSCULAR; INTRAVENOUS at 09:10

## 2019-10-17 RX ADMIN — SODIUM CHLORIDE: 0.9 INJECTION, SOLUTION INTRAVENOUS at 09:10

## 2019-10-17 RX ADMIN — PROMETHAZINE HYDROCHLORIDE 12.5 MG: 25 INJECTION INTRAMUSCULAR; INTRAVENOUS at 11:10

## 2019-10-17 RX ADMIN — LIDOCAINE HYDROCHLORIDE 60 MG: 20 INJECTION, SOLUTION INTRAVENOUS at 09:10

## 2019-10-17 RX ADMIN — PROPOFOL 140 MG: 10 INJECTION, EMULSION INTRAVENOUS at 09:10

## 2019-10-17 RX ADMIN — SODIUM CHLORIDE: 0.9 INJECTION, SOLUTION INTRAVENOUS at 08:10

## 2019-10-17 RX ADMIN — MIDAZOLAM 2 MG: 1 INJECTION INTRAMUSCULAR; INTRAVENOUS at 09:10

## 2019-10-17 NOTE — ANESTHESIA PREPROCEDURE EVALUATION
10/17/2019  Gretchen Ahmadi is a 64 y.o., female  With h/o GIB here for signle vs double balloon enteroscopy.    Anesthesia Evaluation    I have reviewed the Patient Summary Reports.    I have reviewed the Nursing Notes.      Review of Systems  Social:  Smoker    Hematology/Oncology:         -- Denies Anemia: Hematology Comments: Not currently anemic   Cardiovascular:  Cardiovascular Normal Exercise tolerance: good  Denies Hypertension.  Denies Valvular problems/Murmurs.  Denies CAD.    Denies CABG/stent.     Pulmonary:   Denies Shortness of breath.        Physical Exam  General:  Well nourished    Airway/Jaw/Neck:  Airway Findings: Mouth Opening: Normal Tongue: Normal  Pre-Existing Airway Tube(s): Oral Endotracheal tube  General Airway Assessment: Adult  Mallampati: III  Improves to II with phonation.  TM Distance: Normal, at least 6 cm      Dental:  Dental Findings: Upper Dentures, Lower Dentures   Chest/Lungs:  Chest/Lungs Clear    Heart/Vascular:  Heart Findings: Normal            Anesthesia Plan  Type of Anesthesia, risks & benefits discussed:  Anesthesia Type:  general, MAC  Patient's Preference:   Intra-op Monitoring Plan:   Intra-op Monitoring Plan Comments:   Post Op Pain Control Plan:   Post Op Pain Control Plan Comments:   Induction:   IV  Beta Blocker:  Patient is not currently on a Beta-Blocker (No further documentation required).       Informed Consent: Patient understands risks and agrees with Anesthesia plan.  Questions answered. Anesthesia consent signed with patient.  ASA Score: 2     Day of Surgery Review of History & Physical: I have interviewed and examined the patient. I have reviewed the patient's H&P dated:            Ready For Surgery From Anesthesia Perspective.

## 2019-10-17 NOTE — ANESTHESIA POSTPROCEDURE EVALUATION
Anesthesia Post Evaluation    Patient: Gretchen Ahmadi    Procedure(s) Performed: Procedure(s) (LRB):  Upper single vs double balloon -prefer Double (N/A)    Final Anesthesia Type: general  Patient location during evaluation: PACU  Patient participation: Yes- Able to Participate  Level of consciousness: awake and alert  Post-procedure vital signs: reviewed and stable  Pain management: adequate  Airway patency: patent  PONV status at discharge: nausea (controlled)  Anesthetic complications: no      Cardiovascular status: hemodynamically stable and blood pressure returned to baseline  Respiratory status: spontaneous ventilation, unassisted and room air  Hydration status: euvolemic  Follow-up not needed.          Vitals Value Taken Time   /73 10/17/2019 11:30 AM   Temp 37.2 °C (98.9 °F) 10/17/2019 11:30 AM   Pulse 78 10/17/2019 11:30 AM   Resp 18 10/17/2019 11:30 AM   SpO2 98 % 10/17/2019 11:30 AM         Event Time     Out of Recovery 11:25:00          Pain/Tyrese Score: Tyrese Score: 10 (10/17/2019 11:30 AM)

## 2019-10-17 NOTE — [employer]ATION PATIENT INSTRUCTIONS
Discharge Summary/Instructions after an Endoscopic Procedure  Patient Name: Gretchen Ahmadi  Patient MRN: [id number]  Patient YOB: 1955 Thursday, October 17, 2019  Sudheer Hagen MD  RESTRICTIONS:  During your procedure today, you received medications for sedation.  These   medications may affect your judgment, balance and coordination.  Therefore,   for 24 hours, you have the following restrictions:   - DO NOT drive a car, operate machinery, make legal/financial decisions,   sign important papers or drink alcohol.    ACTIVITY:  Today: no heavy lifting, straining or running due to procedural   sedation/anesthesia.  The following day: return to full activity including work.  DIET:  Eat and drink normally unless instructed otherwise.     TREATMENT FOR COMMON SIDE EFFECTS:  - Mild abdominal pain, nausea, belching, bloating or excessive gas:  rest,   eat lightly and use a heating pad.  - Sore Throat: treat with throat lozenges and/or gargle with warm salt   water.  - Because air was used during the procedure, expelling large amounts of air   from your rectum or belching is normal.  - If a bowel prep was taken, you may not have a bowel movement for 1-3 days.    This is normal.  SYMPTOMS TO WATCH FOR AND REPORT TO YOUR PHYSICIAN:  1. Abdominal pain or bloating, other than gas cramps.  2. Chest pain.  3. Back pain.  4. Signs of infection such as: chills or fever occurring within 24 hours   after the procedure.  5. Rectal bleeding, which would show as bright red, maroon, or black stools.   (A tablespoon of blood from the rectum is not serious, especially if   hemorrhoids are present.)  6. Vomiting.  7. Weakness or dizziness.  GO DIRECTLY TO THE NEAREST EMERGENCY ROOM IF YOU HAVE ANY OF THE FOLLOWING:      Difficulty breathing              Chills and/or fever over 101 F   Persistent vomiting and/or vomiting blood   Severe abdominal pain   Severe chest pain   Black, tarry stools   Bleeding- more than one  tablespoon   Any other symptom or condition that you feel may need urgent attention  Your doctor recommends these additional instructions:  If any biopsies were taken, your doctors clinic will contact you in 1 to 2   weeks with any results.  Aggressive iron supplementation including periodic parenteral iron as   necessary   If this patient's anemia fails to improve despite adequate iron   supplementation, I will prescribe a 6 month trial of sandostatin monthly   injections   Discharge to home   Condition stable   Resume previous diet   The signs and symptoms of potential delayed complications were discussed   with the patient. If signs or symptoms of these complications develop, call   the [facility] at 1 [phone].   Return to normal activities tomorrow.  Written discharge instructions were   provided to the patient.  For questions, problems or results please call your physician - Sudheer Hagen MD at Work:  [phone].  EMERGENCY PHONE NUMBER: [phone],  LAB RESULTS: [phone]  IF A COMPLICATION OR EMERGENCY SITUATION ARISES AND YOU ARE UNABLE TO REACH   YOUR PHYSICIAN - GO DIRECTLY TO THE EMERGENCY ROOM.  MD Sudheer Reyes MD  10/17/2019 11:19:44 AM  This report has been verified and signed electronically.  [employer]

## 2019-10-17 NOTE — PLAN OF CARE
Signed out from pacu per Dr Macias. Report called to Carmen Hernandez/GI. Transported to ops via stretcher,sr upx2.

## 2019-10-17 NOTE — TRANSFER OF CARE
"Anesthesia Transfer of Care Note    Patient: Gretchen Ahmadi    Procedure(s) Performed: Procedure(s) (LRB):  Upper single vs double balloon -prefer Double (N/A)    Patient location: PACU    Anesthesia Type: general    Transport from OR: Transported from OR on 6-10 L/min O2 by face mask with adequate spontaneous ventilation    Post pain: adequate analgesia    Post assessment: no apparent anesthetic complications and tolerated procedure well    Post vital signs: stable    Level of consciousness: awake, alert and oriented    Nausea/Vomiting: no nausea/vomiting    Complications: none    Transfer of care protocol was followed      Last vitals:   Visit Vitals  /73 (BP Location: Left arm, Patient Position: Lying)   Pulse 95   Temp 36.4 °C (97.5 °F) (Skin)   Resp 20   Ht 5' 2" (1.575 m)   Wt 50.8 kg (112 lb)   SpO2 97%   Breastfeeding? No   BMI 20.49 kg/m²     "

## 2019-10-17 NOTE — H&P
"[facility]    CC: Anemia due to chronic blood loss    HPI 64 y.o. female with history of chronic headache and chronic back pain who presents with chronic, recurrent, severe iron deficiency anemia associated with classic angiectasias ablated in the stomach and duodenum 2 years ago. She has routine CBCs performed every 3 months to monitor her chronic blood loss. Her last 2 CBCs showed a drop in Hgb from 12.9 in May 2019 to 10.5 last week. She was referred here for balloon enteroscopy evaluation of small bowel bleed. She last received IV iron in May and takes oral iron pills daily. She has not required a PRBC transfusion since 2 years ago.      The patient endorses generalized fatigue and 4 pound weight loss in the last 3 months. She notes she might have a dark stool every once in a while, but it is never black or tarry. She periodically has blood when she wipes after a bowel movement (once a month), but never any velia blood in her stool. She denies diarrhea, constipation, syncope, loss of appetite, and abdominal pain. She does not take NSAIDS or blood thinners.    Her last EGD was done in [place] on 6/10/2019 and showed no significant pathologic findings. Her last colonoscopy was done in [place] a year ago and showed "very little polyps". An upper single balloon enteroscopy performed by Dr. Hagen on 8/24/2017 showed multiple angiectasias of the stomach and duodenum which were ablated. Her Hgb levels were stable until this last CBC per her.     She has family medical history of colon cancer in her maternal uncle.    Her  was present in the room with her during the visit.    Past Medical History:   Diagnosis Date    Arthritis     Chronic back pain     Encounter for blood transfusion        Review of Systems  General ROS: negative for chills and fever. Positive for 4 pound weight loss over 3 months  Respiratory ROS: no cough, shortness of breath, or wheezing  Cardiovascular ROS: no chest " "pain or dyspnea on exertion  Gastrointestinal ROS: no abdominal pain or change in bowel habits. Possible rare episodes of melena and hematochezia (<1 per month)      Physical Examination  /73 (BP Location: Left arm, Patient Position: Lying)   Pulse 95   Temp 97.5 °F (36.4 °C) (Skin)   Resp 20   Ht 5' 2" (1.575 m)   Wt 50.8 kg (112 lb)   SpO2 97%   Breastfeeding? No   BMI 20.49 kg/m²   General appearance: alert, cooperative, no distress  HENT: Normocephalic, atraumatic, neck symmetrical, no nasal discharge   Eyes: conjunctivae/corneas clear, PERRL, EOM's intact  Lungs: clear to auscultation bilaterally, no dullness to percussion bilaterally  Heart: regular rate and rhythm without rub; no displacement of the PMI   Abdomen: soft, non-tender; bowel sounds normoactive; no organomegaly  Extremities: extremities symmetric; no clubbing, cyanosis, or edema  Integument: Skin color, texture, turgor normal; no rashes; hair distrubution normal    Labs: Personally reviewed  (Performed on 9/4/2019)    WBC 7.0 / Hgb 10.5 / Hct 34.5 /  Platelet 230 /      Ferritin 18.8 / TIBC 234 / Iron Sat 34% / Iron level 79 / Folate >20 (H) / Vitamin B12 621       Assessment:   Mrs. Ahmadi is a 64 y.o. female with history of chronic headache and chronic back pain who presents with chronic, recurrent, severe iron deficiency anemia associated with classic angiectasias ablated in the stomach, duodenum and jejunum by me 2 years ago.     Plan:   - We discussed that her angiectasias are most likely the cause of her chronic blood loss. It was stressed that this is a chronic problem treated symptomatically with ablation and iron supplementation. If her symptoms acutely worsen despite this treatment we will look into other options such as octreotide. The balloon enteroscopy risk and benefits were discussed. All of her questions were answered and she decided to undergo the procedure.  - Upper balloon enteroscopy today     - Patient of " Dr. Cailin Hagen MD   [address], [address]   [place] [address]   [phone]

## 2019-10-18 VITALS
RESPIRATION RATE: 16 BRPM | HEART RATE: 80 BPM | BODY MASS INDEX: 20.61 KG/M2 | SYSTOLIC BLOOD PRESSURE: 120 MMHG | DIASTOLIC BLOOD PRESSURE: 78 MMHG | OXYGEN SATURATION: 98 % | TEMPERATURE: 99 F | HEIGHT: 62 IN | WEIGHT: 112 LBS

## 2020-08-12 ENCOUNTER — TELEPHONE (OUTPATIENT)
Dept: NEUROLOGY | Facility: HOSPITAL | Age: 65
End: 2020-08-12

## 2020-08-12 NOTE — TELEPHONE ENCOUNTER
----- Message from Rylie Smith sent at 8/12/2020  1:06 PM CDT -----  Contact: self [phone]  GI - Patient  is calling to inquire have you received a referral from DR. Rojas for his wife. Please call

## 2020-08-12 NOTE — TELEPHONE ENCOUNTER
, requesting if referral received from Dr. SUGGS  Informed referral received and he/she will be notified with Dr. Hagen recommendations.

## 2020-08-14 ENCOUNTER — TELEPHONE (OUTPATIENT)
Dept: NEUROLOGY | Facility: HOSPITAL | Age: 65
End: 2020-08-14

## 2020-08-19 ENCOUNTER — OFFICE VISIT (OUTPATIENT)
Dept: NEUROLOGY | Facility: HOSPITAL | Age: 65
End: 2020-08-19
Attending: INTERNAL MEDICINE
Payer: MEDICARE

## 2020-08-19 VITALS
SYSTOLIC BLOOD PRESSURE: 128 MMHG | BODY MASS INDEX: 20.69 KG/M2 | HEART RATE: 88 BPM | DIASTOLIC BLOOD PRESSURE: 85 MMHG | HEIGHT: 62 IN | WEIGHT: 112.44 LBS

## 2020-08-19 DIAGNOSIS — Z03.818 ENCOUNTER FOR OBSERVATION FOR SUSPECTED EXPOSURE TO OTHER BIOLOGICAL AGENTS RULED OUT: ICD-10-CM

## 2020-08-19 DIAGNOSIS — K92.2 SMALL BOWEL BLEED NOT REQUIRING MORE THAN 4 UNITS OF BLOOD IN 24 HOURS, ICU, OR SURGERY: Primary | ICD-10-CM

## 2020-08-19 PROCEDURE — 99214 OFFICE O/P EST MOD 30 MIN: CPT | Performed by: INTERNAL MEDICINE

## 2020-08-19 RX ORDER — PANTOPRAZOLE SODIUM 40 MG/1
TABLET, DELAYED RELEASE ORAL
Status: ON HOLD | COMMUNITY
End: 2020-11-16 | Stop reason: CLARIF

## 2020-08-19 RX ORDER — FOLIC ACID 1 MG/1
TABLET ORAL
Status: ON HOLD | COMMUNITY
End: 2020-11-16 | Stop reason: CLARIF

## 2020-08-19 RX ORDER — OXYCODONE AND ACETAMINOPHEN 10; 325 MG/1; MG/1
TABLET ORAL
COMMUNITY
Start: 2020-08-14

## 2020-08-19 NOTE — PROGRESS NOTES
"[facility]    CC: Anemia     HPI 65 y.o. female with history of chronic headache and chronic back pain who presents with chronic, recurrent, severe iron deficiency anemia associated with classic angiectasias ablated in the stomach and duodenum 3 years ago and in the proximal and mid jejunum 9/2019 and parenteral iron was recommended with consideration of sandostatin injections if her anemia remained refractory. She notes she is doing well today, normal brown stools, no dizziness or chest pain. Hgb is still somewhat labile, most recently 10.5 and with iron studies still showing BRITTANY from one week ago, that is up from 9.4 the month prior on records she brought with her today. She has undergone only one iron infusion (Ferrlecit) which was roughly a month ago. She feels fine currently and has no acute complaints.    She has family medical history of colon cancer in her maternal uncle.    Her  was present in the room with her during the visit.    Past Medical History:   Diagnosis Date    Arthritis     Chronic back pain     Encounter for blood transfusion          Past Surgical History:   Procedure Laterality Date    BACK SURGERY      CARPAL TUNNEL RELEASE      CHOLECYSTECTOMY      ENDOSCOPY OF PROXIMAL SMALL INTESTINE N/A 10/17/2019    Procedure: Upper single vs double balloon -prefer Double;  Surgeon: Sudheer Hagen MD;  Location: [facility] END[facility];  Service: Endoscopy;  Laterality: N/A;    HYSTERECTOMY      JOINT REPLACEMENT             Review of Systems  General ROS: negative for chills and fever. Positive for 4 pound weight loss over 3 months  Cardiovascular ROS: no chest pain or dyspnea on exertion  Gastrointestinal ROS: no abdominal pain or change in bowel habits.    Physical Examination  /85 (BP Location: Right arm, Patient Position: Sitting, BP Method: Medium (Automatic))   Pulse 88   Ht 5' 2" (1.575 m)   Wt 51 kg (112 lb 7 oz)   BMI 20.56 kg/m²   General appearance: alert, " cooperative, no distress  HENT: Normocephalic, atraumatic, neck symmetrical, no nasal discharge   Eyes: conjunctivae/corneas clear, PERRL, EOM's intact  Lungs: clear to auscultation bilaterally, no dullness to percussion bilaterally  Heart: regular rate and rhythm without rub; no displacement of the PMI   Abdomen: soft, non-tender; bowel sounds normoactive; no organomegaly  Extremities: extremities symmetric; no clubbing, cyanosis, or edema  Integument: Skin color, texture, turgor normal; no rashes; hair distrubution normal  Neurologic: Alert and oriented X 3, normal strength, normal coordination and gait. No focal neurologic deficits.  P  Labs: Personally reviewed  Hgb 10.5, iron 24, ferritin 14 8/2020    Assessment:   Mrs. Ahmadi is a 64 y.o. female with history of chronic headache and chronic back pain who presents with chronic, recurrent, severe iron deficiency anemia associated w/ small bowel AVMs, most recently w/ multiple classic AVMs ablated in the proximal and mid jejunum 9/2019. Hgb still labile 9.4 ->10.5 last month to earlier this month, and iron studies w/ continued BRITTANY but has only undergone one iron infusion roughly a month ago. She has not required any transfusions.    Plan:   - We discussed that her angiectasias are most likely the cause of her chronic blood loss. It was stressed that this is a chronic problem treated symptomatically with ablation and iron supplementation. If her symptoms acutely worsen despite this treatment and she starts requiring transfusions we will look into other options such as octreotide.   - Upper double balloon enteroscopy enteroscopy scheduled for 9/28/20  - 2 doses of injectafer ordered and to be completed before her procedure, will plan to continue injectafer every 3-6 months going forward for primary management of her AVMs/BRITTANY with plans to ablate lesions as needed, hopeful this will not be needed as frequently with consistent iron infusions going forward  - Patient  of Dr. Simeon

## 2020-08-19 NOTE — PATIENT INSTRUCTIONS
Covid 19 test scheduled on Friday, September 25, 2020 at [facility] at 910am, [id number] [facility] in [place].    You are scheduled for an Short Upper DBE     on ________Monday, September 28, 2020_________________________    You should eat light meals the day before the procedure and nothing to eat or drink after midnight the night before your procedure.    You will need to be at the 1st floor admission desk at the hospital on ___Endoscopy staff will contact with arrival time 2-3 days prior to procedure._____________________

## 2020-08-24 ENCOUNTER — TELEPHONE (OUTPATIENT)
Dept: NEUROLOGY | Facility: HOSPITAL | Age: 65
End: 2020-08-24

## 2020-08-24 NOTE — TELEPHONE ENCOUNTER
requesting status of injectafar.  Informed Endoscopy staff will contact to schedule.  Pt acknowledged understanding.

## 2020-09-03 ENCOUNTER — INFUSION (OUTPATIENT)
Dept: INFUSION THERAPY | Facility: HOSPITAL | Age: 65
End: 2020-09-03
Attending: INTERNAL MEDICINE
Payer: MEDICARE

## 2020-09-03 VITALS
BODY MASS INDEX: 20.69 KG/M2 | DIASTOLIC BLOOD PRESSURE: 71 MMHG | HEART RATE: 80 BPM | OXYGEN SATURATION: 98 % | WEIGHT: 112.44 LBS | TEMPERATURE: 98 F | SYSTOLIC BLOOD PRESSURE: 109 MMHG | HEIGHT: 62 IN | RESPIRATION RATE: 18 BRPM

## 2020-09-03 DIAGNOSIS — D50.0 IRON DEFICIENCY ANEMIA DUE TO CHRONIC BLOOD LOSS: Primary | ICD-10-CM

## 2020-09-03 PROCEDURE — 96365 THER/PROPH/DIAG IV INF INIT: CPT

## 2020-09-03 PROCEDURE — 25000003 PHARM REV CODE 250: Performed by: INTERNAL MEDICINE

## 2020-09-03 PROCEDURE — 63600175 PHARM REV CODE 636 W HCPCS: Mod: JG | Performed by: INTERNAL MEDICINE

## 2020-09-03 RX ORDER — SODIUM CHLORIDE 0.9 % (FLUSH) 0.9 %
10 SYRINGE (ML) INJECTION
Status: CANCELLED
Start: 2020-09-03

## 2020-09-03 RX ORDER — SODIUM CHLORIDE 0.9 % (FLUSH) 0.9 %
10 SYRINGE (ML) INJECTION
Status: CANCELLED
Start: 2020-09-10

## 2020-09-03 RX ADMIN — FERRIC CARBOXYMALTOSE INJECTION 750 MG: 50 INJECTION, SOLUTION INTRAVENOUS at 09:09

## 2020-09-10 ENCOUNTER — INFUSION (OUTPATIENT)
Dept: INFUSION THERAPY | Facility: HOSPITAL | Age: 65
End: 2020-09-10
Attending: INTERNAL MEDICINE
Payer: MEDICARE

## 2020-09-10 VITALS
RESPIRATION RATE: 18 BRPM | HEART RATE: 97 BPM | DIASTOLIC BLOOD PRESSURE: 74 MMHG | WEIGHT: 112.44 LBS | OXYGEN SATURATION: 100 % | SYSTOLIC BLOOD PRESSURE: 123 MMHG | TEMPERATURE: 98 F | HEIGHT: 62 IN | BODY MASS INDEX: 20.69 KG/M2

## 2020-09-10 DIAGNOSIS — D50.0 IRON DEFICIENCY ANEMIA DUE TO CHRONIC BLOOD LOSS: Primary | ICD-10-CM

## 2020-09-10 PROCEDURE — 96365 THER/PROPH/DIAG IV INF INIT: CPT

## 2020-09-10 PROCEDURE — 25000003 PHARM REV CODE 250: Performed by: INTERNAL MEDICINE

## 2020-09-10 PROCEDURE — A4216 STERILE WATER/SALINE, 10 ML: HCPCS | Performed by: INTERNAL MEDICINE

## 2020-09-10 PROCEDURE — 63600175 PHARM REV CODE 636 W HCPCS: Mod: JG | Performed by: INTERNAL MEDICINE

## 2020-09-10 RX ORDER — SODIUM CHLORIDE 0.9 % (FLUSH) 0.9 %
10 SYRINGE (ML) INJECTION
Status: CANCELLED
Start: 2020-09-10

## 2020-09-10 RX ORDER — SODIUM CHLORIDE 0.9 % (FLUSH) 0.9 %
10 SYRINGE (ML) INJECTION
Status: DISCONTINUED | OUTPATIENT
Start: 2020-09-10 | End: 2020-09-10 | Stop reason: HOSPADM

## 2020-09-10 RX ADMIN — FERRIC CARBOXYMALTOSE INJECTION 750 MG: 50 INJECTION, SOLUTION INTRAVENOUS at 09:09

## 2020-09-10 RX ADMIN — Medication 10 ML: at 10:09

## 2020-09-10 NOTE — NURSING
Pt tolerated Injectafer infusion 2 of 2 well. No adverse reaction noted. Pt education reinforced on potential side effects, what to expect, and when to call physician; IV flushed with NS and D/C per protocol. Patient ambulated out of clinic in no acute distress.

## 2020-09-24 ENCOUNTER — TELEPHONE (OUTPATIENT)
Dept: ENDOSCOPY | Facility: HOSPITAL | Age: 65
End: 2020-09-24

## 2020-09-24 NOTE — TELEPHONE ENCOUNTER
Spoke with patient about arrival time @ 0700.   Covid test = pnd, 0925/20  Light meals on Sunday.    NPO status reviewed: Patient must have nothing to eat after midnight.   Medications: Do not take Insulin or oral diabetic medications the day of the procedure.  Take as prescribed: heart, seizure and blood pressure medication in the morning with a sip of water (less than an ounce).  Take any breathing medications and bring inhalers to hospital with you Leave all valuables and jewelry at home.     Wear comfortable clothes to procedure to change into hospital gown You cannot drive for 24 hours after your procedure because you will receive sedation for your procedure to make you comfortable.  A ride must be provided at discharge.

## 2020-09-25 ENCOUNTER — LAB VISIT (OUTPATIENT)
Dept: OTOLARYNGOLOGY | Facility: CLINIC | Age: 65
End: 2020-09-25
Payer: MEDICARE

## 2020-09-25 DIAGNOSIS — Z03.818 ENCOUNTER FOR OBSERVATION FOR SUSPECTED EXPOSURE TO OTHER BIOLOGICAL AGENTS RULED OUT: ICD-10-CM

## 2020-09-25 PROCEDURE — U0003 INFECTIOUS AGENT DETECTION BY NUCLEIC ACID (DNA OR RNA); SEVERE ACUTE RESPIRATORY SYNDROME CORONAVIRUS 2 (SARS-COV-2) (CORONAVIRUS DISEASE [COVID-19]), AMPLIFIED PROBE TECHNIQUE, MAKING USE OF HIGH THROUGHPUT TECHNOLOGIES AS DESCRIBED BY CMS-2020-01-R: HCPCS

## 2020-09-26 DIAGNOSIS — U07.1 COVID-19 VIRUS DETECTED: ICD-10-CM

## 2020-09-26 LAB — SARS-COV-2 RNA RESP QL NAA+PROBE: DETECTED

## 2020-09-27 ENCOUNTER — NURSE TRIAGE (OUTPATIENT)
Dept: ADMINISTRATIVE | Facility: CLINIC | Age: 65
End: 2020-09-27

## 2020-09-27 NOTE — TELEPHONE ENCOUNTER
Pt tested positive for COVID. Has a colonoscopy in the morning. Per protocol, advised pt to self isolate and gave home care advice. Will send message to GI.    Reason for Disposition   [1] COVID-19 diagnosed by positive lab test AND [2] mild symptoms (e.g., cough, fever, others) AND [3] no complications or SOB    Additional Information   Negative: SEVERE difficulty breathing (e.g., struggling for each breath, speaks in single words)   Negative: Difficult to awaken or acting confused (e.g., disoriented, slurred speech)   Negative: Bluish (or gray) lips or face now   Negative: Shock suspected (e.g., cold/pale/clammy skin, too weak to stand, low BP, rapid pulse)   Negative: Sounds like a life-threatening emergency to the triager   Negative: SEVERE or constant chest pain or pressure (Exception: mild central chest pain, present only when coughing)   Negative: MODERATE difficulty breathing (e.g., speaks in phrases, SOB even at rest, pulse 100-120)   Negative: Patient sounds very sick or weak to the triager   Negative: MILD difficulty breathing (e.g., minimal/no SOB at rest, SOB with walking, pulse <100)   Negative: Chest pain or pressure   Negative: Fever > 103 F (39.4 C)   Negative: [1] Fever > 101 F (38.3 C) AND [2] age > 60   Negative: [1] Fever > 100.0 F (37.8 C) AND [2] bedridden (e.g., nursing home patient, CVA, chronic illness, recovering from surgery)   Negative: HIGH RISK patient (e.g., age > 64 years, diabetes, heart or lung disease, weak immune system)   Negative: Fever present > 3 days (72 hours)   Negative: [1] Fever returns after gone for over 24 hours AND [2] symptoms worse or not improved   Negative: [1] Continuous (nonstop) coughing interferes with work or school AND [2] no improvement using cough treatment per protocol   Negative: [1] COVID-19 infection suspected by caller or triager AND [2] mild symptoms (cough, fever, or others) AND [3] no complications or SOB    Protocols used:  CORONAVIRUS (COVID-19) DIAGNOSED OR [id number]

## 2020-09-28 ENCOUNTER — TELEPHONE (OUTPATIENT)
Dept: ENDOSCOPY | Facility: HOSPITAL | Age: 65
End: 2020-09-28
Payer: MEDICARE

## 2020-09-28 ENCOUNTER — ANESTHESIA (OUTPATIENT)
Dept: ENDOSCOPY | Facility: HOSPITAL | Age: 65
End: 2020-09-28
Payer: MEDICARE

## 2020-09-28 ENCOUNTER — TELEPHONE (OUTPATIENT)
Dept: NEUROLOGY | Facility: HOSPITAL | Age: 65
End: 2020-09-28

## 2020-09-28 ENCOUNTER — ANESTHESIA EVENT (OUTPATIENT)
Dept: ENDOSCOPY | Facility: HOSPITAL | Age: 65
End: 2020-09-28
Payer: MEDICARE

## 2020-09-28 DIAGNOSIS — Z03.818 ENCOUNTER FOR OBSERVATION FOR SUSPECTED EXPOSURE TO OTHER BIOLOGICAL AGENTS RULED OUT: ICD-10-CM

## 2020-09-28 NOTE — H&P
[facility]     CC: Anemia      HPI 65 y.o. female with history of chronic headache and chronic back pain who presents with chronic, recurrent, severe iron deficiency anemia associated with classic angiectasias ablated in the stomach and duodenum 3 years ago and in the proximal and mid jejunum 9/2019 and parenteral iron was recommended with consideration of sandostatin injections if her anemia remained refractory. She notes she is doing well today, normal brown stools, no dizziness or chest pain. Hgb is still somewhat labile, most recently 10.5 and with iron studies still showing BRITTANY from one week ago, that is up from 9.4 the month prior on records she brought with her today. She has undergone only one iron infusion (Ferrlecit) which was roughly a month ago. She feels fine currently and has no acute complaints.     She has family medical history of colon cancer in her maternal uncle.     Her  was present in the room with her during the visit.          Past Medical History:   Diagnosis Date    Arthritis      Chronic back pain      Encounter for blood transfusion         Review of Systems  General ROS: negative for chills and fever. Positive for 4 pound weight loss over 3 months  Cardiovascular ROS: no chest pain or dyspnea on exertion  Gastrointestinal ROS: no abdominal pain or change in bowel habits.     Physical Examination  General appearance: alert, cooperative, no distress  HENT: Normocephalic, atraumatic, neck symmetrical, no nasal discharge   Lungs: clear to auscultation bilaterally, no dullness to percussion bilaterally  Heart: regular rate and rhythm without rub; no displacement of the PMI   Abdomen: soft, non-tender; bowel sounds normoactive; no organomegaly  Extremities: extremities symmetric; no clubbing, cyanosis, or edema    Labs: Personally reviewedbHgb 10.5, iron 24, ferritin 14 8/2020     Assessment:   Mrs. Ahmadi is a 64 y.o. female with history of chronic headache and chronic  back pain who presents with chronic, recurrent, severe iron deficiency anemia associated w/ small bowel AVMs, most recently w/ multiple classic AVMs ablated in the proximal and mid jejunum 9/2019. Hgb still labile 9.4 ->10.5 last month to earlier this month, and iron studies w/ continued BRITTANY but has only undergone one iron infusion roughly a month ago. She has not required any transfusions.     Plan:   Proceed with upper double balloon enteroscopy enteroscopy  Patient of Dr. Simeon

## 2020-09-28 NOTE — TELEPHONE ENCOUNTER
August, husband, requesting what to do now that pt tested positive for covid. Informed pt must be symptom free for 10 days.   Upper SBE rescheduled on Monday, August 16, 2020 at [facility].  Covid 19 scheduled on 11/16/20 at [facility] at 930am on Friday, November 13, 2020.

## 2020-10-26 ENCOUNTER — TELEPHONE (OUTPATIENT)
Dept: NEUROLOGY | Facility: HOSPITAL | Age: 65
End: 2020-10-26

## 2020-10-26 LAB
EXT 24 HR UR METANEPHRINE: ABNORMAL
EXT 24 HR UR NORMETANEPHRINE: ABNORMAL
EXT 24 HR UR NORMETANEPHRINE: ABNORMAL
EXT 25 HYDROXY VIT D2: ABNORMAL
EXT 25 HYDROXY VIT D3: ABNORMAL
EXT 5 HIAA 24 HR URINE: ABNORMAL
EXT 5 HIAA BLOOD: ABNORMAL
EXT ACTH: ABNORMAL
EXT AFP: ABNORMAL
EXT ALBUMIN: 4 G/DL (ref 3.8–4.8)
EXT ALKALINE PHOSPHATASE: 165 IU/L (ref 39–117)
EXT ALT: 5 IU/L (ref 0–32)
EXT AMYLASE: ABNORMAL
EXT ANTI ISLET CELL AB: ABNORMAL
EXT ANTI PARIETAL CELL AB: ABNORMAL
EXT ANTI THYROID AB: ABNORMAL
EXT AST: 10 IU/L (ref 0–40)
EXT BILIRUBIN DIRECT: ABNORMAL
EXT BILIRUBIN TOTAL: <0.2 MG/DL (ref 0–1.2)
EXT BK VIRUS DNA QN PCR: ABNORMAL
EXT BUN: 10 MG/DL (ref 8–27)
EXT C PEPTIDE: ABNORMAL
EXT CA 125: ABNORMAL
EXT CA 19-9: ABNORMAL
EXT CA 27-29: ABNORMAL
EXT CALCITONIN: ABNORMAL
EXT CALCIUM: 9.1 MG/DL (ref 8.7–10.3)
EXT CEA: ABNORMAL
EXT CHLORIDE: 105 MMOL/L (ref 96–106)
EXT CHOLESTEROL: ABNORMAL
EXT CHROMOGRANIN A: ABNORMAL
EXT CO2: 21 MMOL/L (ref 20–29)
EXT CREATININE UA: ABNORMAL
EXT CREATININE: 0.82 MG/DL (ref 0.57–1)
EXT CYCLOSPORONE LEVEL: ABNORMAL
EXT DOPAMINE: ABNORMAL
EXT EBV DNA BY PCR: ABNORMAL
EXT EPINEPHRINE: ABNORMAL
EXT FOLATE: 7 NG/ML (ref 3–?)
EXT FREE T3: ABNORMAL
EXT FREE T4: ABNORMAL
EXT FSH: ABNORMAL
EXT GASTRIN RELEASING PEPTIDE: ABNORMAL
EXT GASTRIN RELEASING PEPTIDE: ABNORMAL
EXT GASTRIN: ABNORMAL
EXT GGT: ABNORMAL
EXT GHRELIN: ABNORMAL
EXT GLUCAGON: ABNORMAL
EXT GLUCOSE: 80 MG/DL (ref 65–99)
EXT GROWTH HORMONE: ABNORMAL
EXT HCV RNA QUANT PCR: ABNORMAL
EXT HDL: ABNORMAL
EXT HEMATOCRIT: 41.6 % (ref 34–46.6)
EXT HEMOGLOBIN A1C: ABNORMAL
EXT HEMOGLOBIN: 13.3 G/DL (ref 11.1–15.9)
EXT HISTAMINE 24 HR URINE: ABNORMAL
EXT HISTAMINE: ABNORMAL
EXT IGF-1: ABNORMAL
EXT IMMUNKNOW (NON-STIMULATED): ABNORMAL
EXT IMMUNKNOW (STIMULATED): ABNORMAL
EXT INR: ABNORMAL
EXT INSULIN: ABNORMAL
EXT LANREOTIDE LEVEL: ABNORMAL
EXT LDH, TOTAL: ABNORMAL
EXT LDL CHOLESTEROL: ABNORMAL
EXT LIPASE: ABNORMAL
EXT MAGNESIUM: ABNORMAL
EXT METANEPHRINE FREE PLASMA: ABNORMAL
EXT MOTILIN: ABNORMAL
EXT NEUROKININ A CAMB: ABNORMAL
EXT NEUROKININ A ISI: ABNORMAL
EXT NEUROTENSIN: ABNORMAL
EXT NOREPINEPHRINE: ABNORMAL
EXT NORMETANEPHRINE: ABNORMAL
EXT NSE: ABNORMAL
EXT OCTREOTIDE LEVEL: ABNORMAL
EXT PANCREASTATIN CAMB: ABNORMAL
EXT PANCREASTATIN ISI: ABNORMAL
EXT PANCREATIC POLYPEPTIDE: ABNORMAL
EXT PHOSPHORUS: ABNORMAL
EXT PLATELETS: 185 X10E3/UL (ref 150–450)
EXT POTASSIUM: 4.7 MMOL/L (ref 3.5–5.2)
EXT PROGRAF LEVEL: ABNORMAL
EXT PROLACTIN: ABNORMAL
EXT PROTEIN TOTAL: 6.5 G/DL (ref 6–8.5)
EXT PROTEIN UA: ABNORMAL
EXT PT: ABNORMAL
EXT PTH, INTACT: ABNORMAL
EXT PTT: ABNORMAL
EXT RAPAMUNE LEVEL: ABNORMAL
EXT SEROTONIN: ABNORMAL
EXT SODIUM: 141 MMOL/L (ref 134–144)
EXT SOMATOSTATIN: ABNORMAL
EXT SUBSTANCE P: ABNORMAL
EXT TRIGLYCERIDES: ABNORMAL
EXT TRYPTASE: ABNORMAL
EXT TSH: ABNORMAL
EXT URIC ACID: ABNORMAL
EXT URINE AMYLASE U/HR: ABNORMAL
EXT URINE AMYLASE U/L: ABNORMAL
EXT VASOACTIVE INTESTINAL POLYPEPTIDE: ABNORMAL
EXT VITAMIN B12: 215 PG/ML (ref 232–1245)
EXT VMA 24 HR URINE: ABNORMAL
EXT WBC: 6.1 X10E3/UL (ref 3.4–10.8)
FERRITIN: 201 NG/ML (ref 15–150)
IRON SATN MFR SERPL: 62 % (ref 15–55)
IRON: 140 UG/DL (ref 27–139)
NEURON SPECIFIC ENOLASE: ABNORMAL

## 2020-10-26 NOTE — TELEPHONE ENCOUNTER
----- Message from Mickie Alberto sent at 10/26/2020  8:31 AM CDT -----  Contact:  August  [phone]  GI   Patient's  is requesting to speak with nurse regarding procedure. Please advise.

## 2020-10-26 NOTE — TELEPHONE ENCOUNTER
August, , requesting if earlier procedure dates available.  Informed as of today, no earlier dates available.

## 2020-11-12 ENCOUNTER — TELEPHONE (OUTPATIENT)
Dept: ENDOSCOPY | Facility: HOSPITAL | Age: 65
End: 2020-11-12

## 2020-11-12 NOTE — TELEPHONE ENCOUNTER
Spoke with patient about arrival time @ 0700.   Short upper DBE  Covid test past positive    NPO status reviewed: Patient must have nothing to eat after midnight.    Medications: Do not take Insulin or oral diabetic medications the day of the procedure.  Take as prescribed: heart, seizure and blood pressure medication in the morning with a sip of water (less than an ounce).  Take any breathing medications and bring inhalers to hospital with you Leave all valuables and jewelry at home.     Wear comfortable clothes to procedure to change into hospital gown You cannot drive for 24 hours after your procedure because you will receive sedation for your procedure to make you comfortable.  A ride must be provided at discharge.

## 2020-11-13 ENCOUNTER — LAB VISIT (OUTPATIENT)
Dept: OTOLARYNGOLOGY | Facility: CLINIC | Age: 65
End: 2020-11-13
Payer: MEDICARE

## 2020-11-13 DIAGNOSIS — Z03.818 ENCOUNTER FOR OBSERVATION FOR SUSPECTED EXPOSURE TO OTHER BIOLOGICAL AGENTS RULED OUT: ICD-10-CM

## 2020-11-13 PROCEDURE — U0003 INFECTIOUS AGENT DETECTION BY NUCLEIC ACID (DNA OR RNA); SEVERE ACUTE RESPIRATORY SYNDROME CORONAVIRUS 2 (SARS-COV-2) (CORONAVIRUS DISEASE [COVID-19]), AMPLIFIED PROBE TECHNIQUE, MAKING USE OF HIGH THROUGHPUT TECHNOLOGIES AS DESCRIBED BY CMS-2020-01-R: HCPCS

## 2020-11-14 LAB — SARS-COV-2 RNA RESP QL NAA+PROBE: NOT DETECTED

## 2020-11-16 ENCOUNTER — HOSPITAL ENCOUNTER (OUTPATIENT)
Facility: HOSPITAL | Age: 65
Discharge: HOME | End: 2020-11-16
Attending: INTERNAL MEDICINE | Admitting: INTERNAL MEDICINE
Payer: MEDICARE

## 2020-11-16 VITALS
BODY MASS INDEX: 20.61 KG/M2 | HEART RATE: 60 BPM | DIASTOLIC BLOOD PRESSURE: 69 MMHG | HEIGHT: 62 IN | RESPIRATION RATE: 16 BRPM | TEMPERATURE: 98 F | SYSTOLIC BLOOD PRESSURE: 111 MMHG | WEIGHT: 112 LBS | OXYGEN SATURATION: 97 %

## 2020-11-16 DIAGNOSIS — D50.0 CHRONIC BLOOD LOSS ANEMIA: ICD-10-CM

## 2020-11-16 DIAGNOSIS — D50.0 IRON DEFICIENCY ANEMIA DUE TO CHRONIC BLOOD LOSS: Primary | ICD-10-CM

## 2020-11-16 PROCEDURE — 27201238 HC BALLOON, OVERTUBE (ANY): Performed by: INTERNAL MEDICINE

## 2020-11-16 PROCEDURE — 37000009 HC ANESTHESIA EA ADD 15 MINS: Performed by: INTERNAL MEDICINE

## 2020-11-16 PROCEDURE — 27202087 HC PROBE, APC: Performed by: INTERNAL MEDICINE

## 2020-11-16 PROCEDURE — 25000003 PHARM REV CODE 250: Performed by: INTERNAL MEDICINE

## 2020-11-16 PROCEDURE — 25000003 PHARM REV CODE 250: Performed by: NURSE ANESTHETIST, CERTIFIED REGISTERED

## 2020-11-16 PROCEDURE — 37000008 HC ANESTHESIA 1ST 15 MINUTES: Performed by: INTERNAL MEDICINE

## 2020-11-16 PROCEDURE — 63600175 PHARM REV CODE 636 W HCPCS: Performed by: NURSE ANESTHETIST, CERTIFIED REGISTERED

## 2020-11-16 PROCEDURE — 86256 FLUORESCENT ANTIBODY TITER: CPT

## 2020-11-16 PROCEDURE — 36415 COLL VENOUS BLD VENIPUNCTURE: CPT

## 2020-11-16 PROCEDURE — 86340 INTRINSIC FACTOR ANTIBODY: CPT

## 2020-11-16 PROCEDURE — 44378 SMALL BOWEL ENDOSCOPY: CPT | Performed by: INTERNAL MEDICINE

## 2020-11-16 RX ORDER — DEXTROMETHORPHAN/PSEUDOEPHED 2.5-7.5/.8
DROPS ORAL
Status: COMPLETED | OUTPATIENT
Start: 2020-11-16 | End: 2020-11-16

## 2020-11-16 RX ORDER — SODIUM CHLORIDE 9 MG/ML
INJECTION, SOLUTION INTRAVENOUS CONTINUOUS
Status: DISCONTINUED | OUTPATIENT
Start: 2020-11-16 | End: 2020-11-16 | Stop reason: HOSPADM

## 2020-11-16 RX ORDER — PROPOFOL 10 MG/ML
INJECTION, EMULSION INTRAVENOUS CONTINUOUS PRN
Status: DISCONTINUED | OUTPATIENT
Start: 2020-11-16 | End: 2020-11-16

## 2020-11-16 RX ORDER — MIDAZOLAM HYDROCHLORIDE 1 MG/ML
INJECTION, SOLUTION INTRAMUSCULAR; INTRAVENOUS
Status: DISCONTINUED | OUTPATIENT
Start: 2020-11-16 | End: 2020-11-16

## 2020-11-16 RX ORDER — LIDOCAINE HCL/PF 100 MG/5ML
SYRINGE (ML) INTRAVENOUS
Status: DISCONTINUED | OUTPATIENT
Start: 2020-11-16 | End: 2020-11-16

## 2020-11-16 RX ORDER — SODIUM CHLORIDE 0.9 % (FLUSH) 0.9 %
10 SYRINGE (ML) INJECTION
Status: ACTIVE | OUTPATIENT
Start: 2020-11-16

## 2020-11-16 RX ORDER — SODIUM CHLORIDE 0.9 % (FLUSH) 0.9 %
10 SYRINGE (ML) INJECTION
Status: DISCONTINUED | OUTPATIENT
Start: 2020-11-16 | End: 2020-11-16 | Stop reason: HOSPADM

## 2020-11-16 RX ORDER — SODIUM CHLORIDE 9 MG/ML
INJECTION, SOLUTION INTRAVENOUS CONTINUOUS
Status: ACTIVE | OUTPATIENT
Start: 2020-11-17

## 2020-11-16 RX ORDER — FENTANYL CITRATE 50 UG/ML
INJECTION, SOLUTION INTRAMUSCULAR; INTRAVENOUS
Status: DISCONTINUED | OUTPATIENT
Start: 2020-11-16 | End: 2020-11-16

## 2020-11-16 RX ORDER — PROPOFOL 10 MG/ML
INJECTION, EMULSION INTRAVENOUS
Status: DISCONTINUED | OUTPATIENT
Start: 2020-11-16 | End: 2020-11-16

## 2020-11-16 RX ADMIN — PROPOFOL 60 MG: 10 INJECTION, EMULSION INTRAVENOUS at 08:11

## 2020-11-16 RX ADMIN — SIMETHICONE 40 MG: 20 SUSPENSION/ DROPS ORAL at 08:11

## 2020-11-16 RX ADMIN — SODIUM CHLORIDE: 0.9 INJECTION, SOLUTION INTRAVENOUS at 07:11

## 2020-11-16 RX ADMIN — MIDAZOLAM 1 MG: 1 INJECTION INTRAMUSCULAR; INTRAVENOUS at 08:11

## 2020-11-16 RX ADMIN — FENTANYL CITRATE 25 MCG: 50 INJECTION, SOLUTION INTRAMUSCULAR; INTRAVENOUS at 08:11

## 2020-11-16 RX ADMIN — LIDOCAINE HYDROCHLORIDE 50 MG: 20 INJECTION, SOLUTION INTRAVENOUS at 08:11

## 2020-11-16 RX ADMIN — GLYCOPYRROLATE 0.2 MG: 0.2 INJECTION, SOLUTION INTRAMUSCULAR; INTRAVITREAL at 08:11

## 2020-11-16 RX ADMIN — PROPOFOL 150 MCG/KG/MIN: 10 INJECTION, EMULSION INTRAVENOUS at 08:11

## 2020-11-16 NOTE — ANESTHESIA POSTPROCEDURE EVALUATION
Anesthesia Post Evaluation    Patient: Gretchen Ahmadi    Procedure(s) Performed: Procedure(s) (LRB):  ENTEROSCOPY-short upper DBE (N/A)    Final Anesthesia Type: MAC    Patient location during evaluation: GI PACU  Patient participation: Yes- Able to Participate  Level of consciousness: awake and alert and oriented  Post-procedure vital signs: reviewed and stable  Pain management: adequate  Airway patency: patent    PONV status at discharge: No PONV  Anesthetic complications: no      Cardiovascular status: blood pressure returned to baseline, hemodynamically stable and stable  Respiratory status: unassisted, spontaneous ventilation and room air  Hydration status: euvolemic  Follow-up not needed.          Vitals Value Taken Time   BP  11/16/20 0909   Temp  11/16/20 0909   Pulse  11/16/20 0909   Resp  11/16/20 0909   SpO2  11/16/20 0909         No case tracking events are documented in the log.      Pain/Tyrese Score: No data recorded

## 2020-11-16 NOTE — H&P
[facility]     CC: Anemia      HPI 65 y.o. female with history of chronic headache and chronic back pain who presents with chronic, recurrent, severe iron deficiency anemia associated with classic angiectasias ablated in the stomach and duodenum 3 years ago and in the proximal and mid jejunum 9/2019 and parenteral iron was recommended with consideration of sandostatin injections if her anemia remained refractory. She notes she is doing well today, normal brown stools, no dizziness or chest pain. Hgb is still somewhat labile, most recently 10.5 and with iron studies still showing BRITTANY from one week ago, that is up from 9.4 the month prior on records she brought with her today. She has undergone only one iron infusion (Ferrlecit) which was roughly a month ago. She feels fine currently and has no acute complaints.     She has family medical history of colon cancer in her maternal uncle.             Past Medical History:   Diagnosis Date    Arthritis      Chronic back pain      Encounter for blood transfusion         Review of Systems  General ROS: negative for chills and fever. Positive for 4 pound weight loss over 3 months  Cardiovascular ROS: no chest pain or dyspnea on exertion  Gastrointestinal ROS: no abdominal pain or change in bowel habits.     Physical Examination  General appearance: alert, cooperative, no distress  HENT: Normocephalic, atraumatic, neck symmetrical, no nasal discharge   Lungs: clear to auscultation bilaterally, no dullness to percussion bilaterally  Heart: regular rate and rhythm without rub; no displacement of the PMI   Abdomen: soft, non-tender; bowel sounds normoactive; no organomegaly  Extremities: extremities symmetric; no clubbing, cyanosis, or edema    Labs: Personally reviewedbHgb 10.5, iron 24, ferritin 14 8/2020     Assessment:   Mrs. Ahmadi is a 64 y.o. female with history of chronic headache and chronic back pain who presents with chronic, recurrent, severe iron  deficiency anemia associated w/ small bowel AVMs, most recently w/ multiple classic AVMs ablated in the proximal and mid jejunum 9/2019. Hgb still labile 9.4 ->10.5 last month to earlier this month, and iron studies w/ continued BRITTANY but has only undergone one iron infusion roughly a month ago. She has not required any transfusions.     Plan:   Proceed with upper double balloon enteroscopy  today  Patient of Dr. Simeon

## 2020-11-16 NOTE — TRANSFER OF CARE
"Anesthesia Transfer of Care Note    Patient: Gretchen Ahmadi    Procedure(s) Performed: Procedure(s) (LRB):  ENTEROSCOPY-short upper DBE (N/A)    Patient location: GI    Anesthesia Type: MAC    Transport from OR: Transported from OR on room air with adequate spontaneous ventilation    Post pain: adequate analgesia    Post assessment: no apparent anesthetic complications and tolerated procedure well    Post vital signs: stable    Level of consciousness: awake, alert and oriented    Nausea/Vomiting: no nausea/vomiting    Complications: none    Transfer of care protocol was followed      Last vitals:   Visit Vitals  /82 (Patient Position: Lying)   Pulse 86   Temp 36.7 °C (98 °F) (Temporal)   Resp 20   Ht 5' 2" (1.575 m)   Wt 50.8 kg (112 lb)   SpO2 98%   Breastfeeding No   BMI 20.49 kg/m²     "

## 2020-11-16 NOTE — [employer]ATION PATIENT INSTRUCTIONS
Discharge Summary/Instructions after an Endoscopic Procedure  Patient Name: Gretchen Ahmadi  Patient MRN: [id number]  Patient YOB: 1955 Monday, November 16, 2020  Sudheer Hagen MD  Your health is very important to us during the Covid Crisis. Following your   procedure today, you will receive a daily text for 2 weeks asking about   signs or symptoms of Covid 19.  Please respond to this text when you   receive it so we can follow up and keep you as safe as possible.   RESTRICTIONS:  During your procedure today, you received medications for sedation.  These   medications may affect your judgment, balance and coordination.  Therefore,   for 24 hours, you have the following restrictions:   - DO NOT drive a car, operate machinery, make legal/financial decisions,   sign important papers or drink alcohol.    ACTIVITY:  Today: no heavy lifting, straining or running due to procedural   sedation/anesthesia.  The following day: return to full activity including work.  DIET:  Eat and drink normally unless instructed otherwise.     TREATMENT FOR COMMON SIDE EFFECTS:  - Mild abdominal pain, nausea, belching, bloating or excessive gas:  rest,   eat lightly and use a heating pad.  - Sore Throat: treat with throat lozenges and/or gargle with warm salt   water.  - Because air was used during the procedure, expelling large amounts of air   from your rectum or belching is normal.  - If a bowel prep was taken, you may not have a bowel movement for 1-3 days.    This is normal.  SYMPTOMS TO WATCH FOR AND REPORT TO YOUR PHYSICIAN:  1. Abdominal pain or bloating, other than gas cramps.  2. Chest pain.  3. Back pain.  4. Signs of infection such as: chills or fever occurring within 24 hours   after the procedure.  5. Rectal bleeding, which would show as bright red, maroon, or black stools.   (A tablespoon of blood from the rectum is not serious, especially if   hemorrhoids are present.)  6. Vomiting.  7. Weakness or dizziness.  GO  DIRECTLY TO THE NEAREST EMERGENCY ROOM IF YOU HAVE ANY OF THE FOLLOWING:      Difficulty breathing              Chills and/or fever over 101 F   Persistent vomiting and/or vomiting blood   Severe abdominal pain   Severe chest pain   Black, tarry stools   Bleeding- more than one tablespoon   Any other symptom or condition that you feel may need urgent attention  Your doctor recommends these additional instructions:  If any biopsies were taken, your doctors clinic will contact you in 1 to 2   weeks with any results.  Check anti-parietal cell and anti-intrinsic factor antibodies today   This patient will likely require periodic iron infusions as medical   management of chronic GI blood loss anemia due to multiple angioectasias.   Discharge to home   Condition stable   Resume previous diet   The signs and symptoms of potential delayed complications were discussed   with the patient. If signs or symptoms of these complications develop, call   the [facility] at 1 [phone].   Return to normal activities tomorrow.  Written discharge instructions were   provided to the patient.  For questions, problems or results please call your physician - Sudheer Hagen MD.  EMERGENCY PHONE NUMBER: [phone],  LAB RESULTS: [phone]  IF A COMPLICATION OR EMERGENCY SITUATION ARISES AND YOU ARE UNABLE TO REACH   YOUR PHYSICIAN - GO DIRECTLY TO THE EMERGENCY ROOM.  MD Sudheer Reyes MD  11/16/2020 9:15:53 AM  This report has been verified and signed electronically.  [employer]

## 2020-11-16 NOTE — ANESTHESIA PREPROCEDURE EVALUATION
11/16/2020  Gretchen Ahmadi is a 65 y.o., female for short upper DBE under MAC/GA    Past Medical History:   Diagnosis Date    Arthritis     Chronic back pain     Encounter for blood transfusion      Past Surgical History:   Procedure Laterality Date    BACK SURGERY      CARPAL TUNNEL RELEASE      CHOLECYSTECTOMY      ENDOSCOPY OF PROXIMAL SMALL INTESTINE N/A 10/17/2019    Procedure: Upper single vs double balloon -prefer Double;  Surgeon: Sudheer Hagen MD;  Location: [facility];  Service: Endoscopy;  Laterality: N/A;    HYSTERECTOMY      JOINT REPLACEMENT           Anesthesia Evaluation    I have reviewed the Patient Summary Reports.   I have reviewed the NPO Status.   I have reviewed the Medications.     Review of Systems  Anesthesia Hx:   Denies Personal Hx of Anesthesia complications.   Social:  Smoker    Hematology/Oncology:         -- Anemia:   Cardiovascular:  Cardiovascular Normal     Renal/:  Renal/ Normal     Hepatic/GI:   GIB   Neurological:  Neurology Normal    Endocrine:  Endocrine Normal        Physical Exam  General:  Well nourished    Airway/Jaw/Neck:  Airway Findings: Mouth Opening: Normal Tongue: Normal  Mallampati: II      Dental:  Dental Findings: Edentulous   Chest/Lungs:  Chest/Lungs Clear    Heart/Vascular:  Heart Findings: Normal            Anesthesia Plan  Type of Anesthesia, risks & benefits discussed:  Anesthesia Type:  MAC, general  Patient's Preference:   Intra-op Monitoring Plan: standard ASA monitors  Intra-op Monitoring Plan Comments:   Post Op Pain Control Plan: multimodal analgesia  Post Op Pain Control Plan Comments:   Induction:   IV  Beta Blocker:  Patient is not currently on a Beta-Blocker (No further documentation required).       Informed Consent: Patient understands risks and agrees with Anesthesia plan.  Questions answered. Anesthesia consent signed  with patient.  ASA Score: 2     Day of Surgery Review of History & Physical:        Anesthesia Plan Notes: Labs reviewed in care everywhere        Ready For Surgery From Anesthesia Perspective.

## 2020-11-18 LAB
ANNOTATION COMMENT IMP: NORMAL
IF BLOCK AB SER QL: NEGATIVE
PCA AB TITR SER IF: NORMAL {TITER}

## 2020-12-21 ENCOUNTER — PATIENT MESSAGE (OUTPATIENT)
Dept: NEUROLOGY | Facility: HOSPITAL | Age: 65
End: 2020-12-21

## 2020-12-28 ENCOUNTER — PATIENT MESSAGE (OUTPATIENT)
Dept: NEUROLOGY | Facility: HOSPITAL | Age: 65
End: 2020-12-28

## 2020-12-29 ENCOUNTER — PATIENT MESSAGE (OUTPATIENT)
Dept: NEUROLOGY | Facility: HOSPITAL | Age: 65
End: 2020-12-29

## 2021-02-16 NOTE — TELEPHONE ENCOUNTER
No Authorization is needed for cpt 06168 per [facility]   Reference # [id number]   Received call from Dr. Michael Wild Hospitalist. Please call patient. He was in ER last night with concerns of abominal pain and had progression of inflammation on his CT Scan. He is scheduled to see Dr. Villarreal 02/24 in follow up. She is requesting we see him sooner. Can we move appt up to this week with Dr. Villarreal please.

## 2021-05-26 ENCOUNTER — TELEPHONE (OUTPATIENT)
Dept: NEUROLOGY | Facility: HOSPITAL | Age: 66
End: 2021-05-26

## 2021-05-26 DIAGNOSIS — D50.0 IRON DEFICIENCY ANEMIA DUE TO CHRONIC BLOOD LOSS: Primary | ICD-10-CM

## 2021-05-28 ENCOUNTER — LAB VISIT (OUTPATIENT)
Dept: LAB | Facility: HOSPITAL | Age: 66
End: 2021-05-28
Attending: INTERNAL MEDICINE
Payer: MEDICARE

## 2021-05-28 DIAGNOSIS — D50.0 IRON DEFICIENCY ANEMIA DUE TO CHRONIC BLOOD LOSS: ICD-10-CM

## 2021-05-28 LAB
BASOPHILS # BLD AUTO: 0.08 K/UL (ref 0–0.2)
BASOPHILS NFR BLD: 1.3 % (ref 0–1.9)
DIFFERENTIAL METHOD: ABNORMAL
EOSINOPHIL # BLD AUTO: 0.2 K/UL (ref 0–0.5)
EOSINOPHIL NFR BLD: 3.2 % (ref 0–8)
ERYTHROCYTE [DISTWIDTH] IN BLOOD BY AUTOMATED COUNT: 13.7 % (ref 11.5–14.5)
FERRITIN SERPL-MCNC: 13 NG/ML (ref 20–300)
HCT VFR BLD AUTO: 36.1 % (ref 37–48.5)
HGB BLD-MCNC: 11.5 G/DL (ref 12–16)
IMM GRANULOCYTES # BLD AUTO: 0.02 K/UL (ref 0–0.04)
IMM GRANULOCYTES NFR BLD AUTO: 0.3 % (ref 0–0.5)
IRON SERPL-MCNC: 22 UG/DL (ref 30–160)
LYMPHOCYTES # BLD AUTO: 2.1 K/UL (ref 1–4.8)
LYMPHOCYTES NFR BLD: 34.7 % (ref 18–48)
MCH RBC QN AUTO: 31.3 PG (ref 27–31)
MCHC RBC AUTO-ENTMCNC: 31.9 G/DL (ref 32–36)
MCV RBC AUTO: 98 FL (ref 82–98)
MONOCYTES # BLD AUTO: 0.5 K/UL (ref 0.3–1)
MONOCYTES NFR BLD: 8.8 % (ref 4–15)
NEUTROPHILS # BLD AUTO: 3.1 K/UL (ref 1.8–7.7)
NEUTROPHILS NFR BLD: 51.7 % (ref 38–73)
NRBC BLD-RTO: 0 /100 WBC
PLATELET # BLD AUTO: 204 K/UL (ref 150–450)
PMV BLD AUTO: 12.1 FL (ref 9.2–12.9)
RBC # BLD AUTO: 3.67 M/UL (ref 4–5.4)
SATURATED IRON: 8 % (ref 20–50)
TOTAL IRON BINDING CAPACITY: 280 UG/DL (ref 250–450)
TRANSFERRIN SERPL-MCNC: 189 MG/DL (ref 200–375)
WBC # BLD AUTO: 6.02 K/UL (ref 3.9–12.7)

## 2021-05-28 PROCEDURE — 82728 ASSAY OF FERRITIN: CPT | Performed by: INTERNAL MEDICINE

## 2021-05-28 PROCEDURE — 83540 ASSAY OF IRON: CPT | Performed by: INTERNAL MEDICINE

## 2021-05-28 PROCEDURE — 36415 COLL VENOUS BLD VENIPUNCTURE: CPT | Performed by: INTERNAL MEDICINE

## 2021-05-28 PROCEDURE — 85025 COMPLETE CBC W/AUTO DIFF WBC: CPT | Performed by: INTERNAL MEDICINE

## 2021-05-29 ENCOUNTER — TELEPHONE (OUTPATIENT)
Dept: NEUROLOGY | Facility: HOSPITAL | Age: 66
End: 2021-05-29

## 2021-05-31 ENCOUNTER — TELEPHONE (OUTPATIENT)
Dept: NEUROLOGY | Facility: HOSPITAL | Age: 66
End: 2021-05-31

## 2021-05-31 RX ORDER — DIPHENHYDRAMINE HYDROCHLORIDE 50 MG/ML
50 INJECTION INTRAMUSCULAR; INTRAVENOUS ONCE AS NEEDED
Status: CANCELLED | OUTPATIENT
Start: 2021-05-31

## 2021-05-31 RX ORDER — METHYLPREDNISOLONE SOD SUCC 125 MG
125 VIAL (EA) INJECTION ONCE AS NEEDED
Status: CANCELLED | OUTPATIENT
Start: 2021-05-31

## 2021-05-31 RX ORDER — SODIUM CHLORIDE 9 MG/ML
INJECTION, SOLUTION INTRAVENOUS CONTINUOUS
Status: CANCELLED | OUTPATIENT
Start: 2021-05-31

## 2021-05-31 RX ORDER — HEPARIN 100 UNIT/ML
5 SYRINGE INTRAVENOUS
Status: CANCELLED | OUTPATIENT
Start: 2021-05-31

## 2021-05-31 RX ORDER — SODIUM CHLORIDE 0.9 % (FLUSH) 0.9 %
10 SYRINGE (ML) INJECTION
Status: CANCELLED | OUTPATIENT
Start: 2021-05-31

## 2021-05-31 RX ORDER — EPINEPHRINE 0.3 MG/.3ML
0.3 INJECTION SUBCUTANEOUS ONCE AS NEEDED
Status: CANCELLED | OUTPATIENT
Start: 2021-05-31

## 2021-06-01 ENCOUNTER — INFUSION (OUTPATIENT)
Dept: INFUSION THERAPY | Facility: HOSPITAL | Age: 66
End: 2021-06-01
Attending: INTERNAL MEDICINE
Payer: MEDICARE

## 2021-06-01 VITALS
HEART RATE: 83 BPM | DIASTOLIC BLOOD PRESSURE: 78 MMHG | TEMPERATURE: 98 F | RESPIRATION RATE: 20 BRPM | OXYGEN SATURATION: 95 % | SYSTOLIC BLOOD PRESSURE: 113 MMHG

## 2021-06-01 DIAGNOSIS — D50.0 IRON DEFICIENCY ANEMIA DUE TO CHRONIC BLOOD LOSS: Primary | ICD-10-CM

## 2021-06-01 PROCEDURE — 25000003 PHARM REV CODE 250: Performed by: INTERNAL MEDICINE

## 2021-06-01 PROCEDURE — 96365 THER/PROPH/DIAG IV INF INIT: CPT

## 2021-06-01 PROCEDURE — 63600175 PHARM REV CODE 636 W HCPCS: Mod: JG | Performed by: INTERNAL MEDICINE

## 2021-06-01 RX ORDER — SODIUM CHLORIDE 0.9 % (FLUSH) 0.9 %
10 SYRINGE (ML) INJECTION
Status: DISCONTINUED | OUTPATIENT
Start: 2021-06-01 | End: 2021-06-01 | Stop reason: HOSPADM

## 2021-06-01 RX ORDER — HEPARIN 100 UNIT/ML
5 SYRINGE INTRAVENOUS
Status: CANCELLED | OUTPATIENT
Start: 2021-06-08

## 2021-06-01 RX ORDER — EPINEPHRINE 0.3 MG/.3ML
0.3 INJECTION SUBCUTANEOUS ONCE AS NEEDED
Status: CANCELLED | OUTPATIENT
Start: 2021-06-08

## 2021-06-01 RX ORDER — DIPHENHYDRAMINE HYDROCHLORIDE 50 MG/ML
50 INJECTION INTRAMUSCULAR; INTRAVENOUS ONCE AS NEEDED
Status: DISCONTINUED | OUTPATIENT
Start: 2021-06-01 | End: 2021-06-01 | Stop reason: HOSPADM

## 2021-06-01 RX ORDER — METHYLPREDNISOLONE SOD SUCC 125 MG
125 VIAL (EA) INJECTION ONCE AS NEEDED
Status: DISCONTINUED | OUTPATIENT
Start: 2021-06-01 | End: 2021-06-01 | Stop reason: HOSPADM

## 2021-06-01 RX ORDER — SODIUM CHLORIDE 0.9 % (FLUSH) 0.9 %
10 SYRINGE (ML) INJECTION
Status: CANCELLED | OUTPATIENT
Start: 2021-06-08

## 2021-06-01 RX ORDER — METHYLPREDNISOLONE SOD SUCC 125 MG
125 VIAL (EA) INJECTION ONCE AS NEEDED
Status: CANCELLED | OUTPATIENT
Start: 2021-06-08

## 2021-06-01 RX ORDER — SODIUM CHLORIDE 9 MG/ML
INJECTION, SOLUTION INTRAVENOUS CONTINUOUS
Status: CANCELLED | OUTPATIENT
Start: 2021-06-08

## 2021-06-01 RX ORDER — EPINEPHRINE 0.3 MG/.3ML
0.3 INJECTION SUBCUTANEOUS ONCE AS NEEDED
Status: DISCONTINUED | OUTPATIENT
Start: 2021-06-01 | End: 2021-06-01 | Stop reason: HOSPADM

## 2021-06-01 RX ORDER — DIPHENHYDRAMINE HYDROCHLORIDE 50 MG/ML
50 INJECTION INTRAMUSCULAR; INTRAVENOUS ONCE AS NEEDED
Status: CANCELLED | OUTPATIENT
Start: 2021-06-08

## 2021-06-01 RX ADMIN — SODIUM CHLORIDE: 0.9 INJECTION, SOLUTION INTRAVENOUS at 11:06

## 2021-06-01 RX ADMIN — FERRIC CARBOXYMALTOSE INJECTION 750 MG: 50 INJECTION, SOLUTION INTRAVENOUS at 11:06

## 2021-06-08 ENCOUNTER — INFUSION (OUTPATIENT)
Dept: INFUSION THERAPY | Facility: HOSPITAL | Age: 66
End: 2021-06-08
Attending: INTERNAL MEDICINE
Payer: MEDICARE

## 2021-06-08 VITALS
HEART RATE: 83 BPM | DIASTOLIC BLOOD PRESSURE: 74 MMHG | SYSTOLIC BLOOD PRESSURE: 127 MMHG | RESPIRATION RATE: 20 BRPM | OXYGEN SATURATION: 93 % | TEMPERATURE: 98 F

## 2021-06-08 DIAGNOSIS — D50.0 IRON DEFICIENCY ANEMIA DUE TO CHRONIC BLOOD LOSS: Primary | ICD-10-CM

## 2021-06-08 PROCEDURE — 96365 THER/PROPH/DIAG IV INF INIT: CPT

## 2021-06-08 PROCEDURE — 63600175 PHARM REV CODE 636 W HCPCS: Mod: JG | Performed by: INTERNAL MEDICINE

## 2021-06-08 PROCEDURE — 25000003 PHARM REV CODE 250: Performed by: INTERNAL MEDICINE

## 2021-06-08 RX ORDER — SODIUM CHLORIDE 0.9 % (FLUSH) 0.9 %
10 SYRINGE (ML) INJECTION
Status: CANCELLED | OUTPATIENT
Start: 2021-06-15

## 2021-06-08 RX ORDER — METHYLPREDNISOLONE SOD SUCC 125 MG
125 VIAL (EA) INJECTION ONCE AS NEEDED
Status: CANCELLED | OUTPATIENT
Start: 2021-06-15

## 2021-06-08 RX ORDER — HEPARIN 100 UNIT/ML
5 SYRINGE INTRAVENOUS
Status: CANCELLED | OUTPATIENT
Start: 2021-06-15

## 2021-06-08 RX ORDER — SODIUM CHLORIDE 0.9 % (FLUSH) 0.9 %
10 SYRINGE (ML) INJECTION
Status: DISCONTINUED | OUTPATIENT
Start: 2021-06-08 | End: 2021-06-08 | Stop reason: HOSPADM

## 2021-06-08 RX ORDER — SODIUM CHLORIDE 9 MG/ML
INJECTION, SOLUTION INTRAVENOUS CONTINUOUS
Status: DISCONTINUED | OUTPATIENT
Start: 2021-06-08 | End: 2021-06-08 | Stop reason: HOSPADM

## 2021-06-08 RX ORDER — DIPHENHYDRAMINE HYDROCHLORIDE 50 MG/ML
50 INJECTION INTRAMUSCULAR; INTRAVENOUS ONCE AS NEEDED
Status: CANCELLED | OUTPATIENT
Start: 2021-06-15

## 2021-06-08 RX ORDER — SODIUM CHLORIDE 9 MG/ML
INJECTION, SOLUTION INTRAVENOUS CONTINUOUS
Status: CANCELLED | OUTPATIENT
Start: 2021-06-15

## 2021-06-08 RX ORDER — HEPARIN 100 UNIT/ML
5 SYRINGE INTRAVENOUS
Status: DISCONTINUED | OUTPATIENT
Start: 2021-06-08 | End: 2021-06-08 | Stop reason: HOSPADM

## 2021-06-08 RX ORDER — EPINEPHRINE 0.3 MG/.3ML
0.3 INJECTION SUBCUTANEOUS ONCE AS NEEDED
Status: CANCELLED | OUTPATIENT
Start: 2021-06-15

## 2021-06-08 RX ADMIN — SODIUM CHLORIDE: 0.9 INJECTION, SOLUTION INTRAVENOUS at 10:06

## 2021-06-08 RX ADMIN — FERRIC CARBOXYMALTOSE INJECTION 750 MG: 50 INJECTION, SOLUTION INTRAVENOUS at 10:06

## 2021-10-06 ENCOUNTER — TELEPHONE (OUTPATIENT)
Dept: NEUROLOGY | Facility: HOSPITAL | Age: 66
End: 2021-10-06

## 2021-10-06 DIAGNOSIS — D50.0 IRON DEFICIENCY ANEMIA DUE TO CHRONIC BLOOD LOSS: Primary | ICD-10-CM

## 2021-10-07 ENCOUNTER — LAB VISIT (OUTPATIENT)
Dept: LAB | Facility: HOSPITAL | Age: 66
End: 2021-10-07
Attending: INTERNAL MEDICINE
Payer: MEDICARE

## 2021-10-07 DIAGNOSIS — D50.0 IRON DEFICIENCY ANEMIA DUE TO CHRONIC BLOOD LOSS: ICD-10-CM

## 2021-10-07 LAB
BASOPHILS # BLD AUTO: 0.04 K/UL (ref 0–0.2)
BASOPHILS NFR BLD: 0.5 % (ref 0–1.9)
DIFFERENTIAL METHOD: ABNORMAL
EOSINOPHIL # BLD AUTO: 0.1 K/UL (ref 0–0.5)
EOSINOPHIL NFR BLD: 0.6 % (ref 0–8)
ERYTHROCYTE [DISTWIDTH] IN BLOOD BY AUTOMATED COUNT: 13.2 % (ref 11.5–14.5)
FERRITIN SERPL-MCNC: 32 NG/ML (ref 20–300)
HCT VFR BLD AUTO: 33.4 % (ref 37–48.5)
HGB BLD-MCNC: 11 G/DL (ref 12–16)
IMM GRANULOCYTES # BLD AUTO: 0.05 K/UL (ref 0–0.04)
IMM GRANULOCYTES NFR BLD AUTO: 0.6 % (ref 0–0.5)
IRON SERPL-MCNC: 36 UG/DL (ref 30–160)
LYMPHOCYTES # BLD AUTO: 1.3 K/UL (ref 1–4.8)
LYMPHOCYTES NFR BLD: 15 % (ref 18–48)
MCH RBC QN AUTO: 34.6 PG (ref 27–31)
MCHC RBC AUTO-ENTMCNC: 32.9 G/DL (ref 32–36)
MCV RBC AUTO: 105 FL (ref 82–98)
MONOCYTES # BLD AUTO: 0.6 K/UL (ref 0.3–1)
MONOCYTES NFR BLD: 7.1 % (ref 4–15)
NEUTROPHILS # BLD AUTO: 6.6 K/UL (ref 1.8–7.7)
NEUTROPHILS NFR BLD: 76.2 % (ref 38–73)
NRBC BLD-RTO: 0 /100 WBC
PLATELET # BLD AUTO: 191 K/UL (ref 150–450)
PMV BLD AUTO: 12.4 FL (ref 9.2–12.9)
RBC # BLD AUTO: 3.18 M/UL (ref 4–5.4)
SATURATED IRON: 13 % (ref 20–50)
TOTAL IRON BINDING CAPACITY: 275 UG/DL (ref 250–450)
TRANSFERRIN SERPL-MCNC: 186 MG/DL (ref 200–375)
WBC # BLD AUTO: 8.59 K/UL (ref 3.9–12.7)

## 2021-10-07 PROCEDURE — 85025 COMPLETE CBC W/AUTO DIFF WBC: CPT | Performed by: INTERNAL MEDICINE

## 2021-10-07 PROCEDURE — 84466 ASSAY OF TRANSFERRIN: CPT | Performed by: INTERNAL MEDICINE

## 2021-10-07 PROCEDURE — 82728 ASSAY OF FERRITIN: CPT | Performed by: INTERNAL MEDICINE

## 2021-10-07 PROCEDURE — 36415 COLL VENOUS BLD VENIPUNCTURE: CPT | Performed by: INTERNAL MEDICINE

## 2021-10-08 ENCOUNTER — TELEPHONE (OUTPATIENT)
Dept: NEUROLOGY | Facility: HOSPITAL | Age: 66
End: 2021-10-08

## 2021-10-08 NOTE — TELEPHONE ENCOUNTER
----- Message from Ginger Pedroza MA sent at 10/8/2021  2:45 PM CDT -----  Contact: [phone]  Type: Requesting to speak with nurse         Who Called: PT  Regarding: returning call  Would the patient rather a call back or a response via [email or web address]? Call back  Best Call Back Number: [phone]  Additional Information:

## 2021-10-08 NOTE — TELEPHONE ENCOUNTER
August, , requesting if iv iron infusion required due to abnormal labs. Informed request forward to Dr. Hagen.  Pt to be notified with Dr. Hagen recommendations.

## 2021-10-14 ENCOUNTER — PATIENT MESSAGE (OUTPATIENT)
Dept: NEUROLOGY | Facility: HOSPITAL | Age: 66
End: 2021-10-14
Payer: MEDICARE

## 2021-10-14 ENCOUNTER — TELEPHONE (OUTPATIENT)
Dept: NEUROLOGY | Facility: HOSPITAL | Age: 66
End: 2021-10-14

## 2022-01-14 ENCOUNTER — TELEPHONE (OUTPATIENT)
Dept: NEUROLOGY | Facility: HOSPITAL | Age: 67
End: 2022-01-14

## 2022-01-14 ENCOUNTER — LAB VISIT (OUTPATIENT)
Dept: LAB | Facility: HOSPITAL | Age: 67
End: 2022-01-14
Attending: INTERNAL MEDICINE
Payer: MEDICARE

## 2022-01-14 DIAGNOSIS — D50.0 IRON DEFICIENCY ANEMIA DUE TO CHRONIC BLOOD LOSS: ICD-10-CM

## 2022-01-14 DIAGNOSIS — D50.0 IRON DEFICIENCY ANEMIA DUE TO CHRONIC BLOOD LOSS: Primary | ICD-10-CM

## 2022-01-14 PROCEDURE — 84466 ASSAY OF TRANSFERRIN: CPT | Performed by: INTERNAL MEDICINE

## 2022-01-14 PROCEDURE — 82728 ASSAY OF FERRITIN: CPT | Performed by: INTERNAL MEDICINE

## 2022-01-14 PROCEDURE — 36415 COLL VENOUS BLD VENIPUNCTURE: CPT | Performed by: INTERNAL MEDICINE

## 2022-01-14 PROCEDURE — 85025 COMPLETE CBC W/AUTO DIFF WBC: CPT | Performed by: INTERNAL MEDICINE

## 2022-01-14 NOTE — TELEPHONE ENCOUNTER
----- Message from Steph Ba sent at 1/14/2022 10:43 AM CST -----  Type:  Needs Medical Advice    Who Called:  pt  Symptoms (please be specific):  would like to get a call back     Would the patient rather a call back or a response via [employer]?    Best Call Back Number:  [phone]  phone   Additional Information:

## 2022-01-14 NOTE — TELEPHONE ENCOUNTER
Pt completed labs from orders placed this morning.  PT had pacemaker implanted in November 2021 and started Eliquis 2.5mg daily.  Dr. Hagen to be notified.

## 2022-01-15 ENCOUNTER — PATIENT MESSAGE (OUTPATIENT)
Dept: NEUROLOGY | Facility: HOSPITAL | Age: 67
End: 2022-01-15
Payer: MEDICARE

## 2022-01-15 ENCOUNTER — TELEPHONE (OUTPATIENT)
Dept: NEUROLOGY | Facility: HOSPITAL | Age: 67
End: 2022-01-15
Payer: MEDICARE

## 2022-01-15 DIAGNOSIS — D50.0 IRON DEFICIENCY ANEMIA DUE TO CHRONIC BLOOD LOSS: ICD-10-CM

## 2022-01-15 DIAGNOSIS — D50.0 IRON DEFICIENCY ANEMIA DUE TO CHRONIC BLOOD LOSS: Primary | ICD-10-CM

## 2022-01-15 LAB
BASOPHILS # BLD AUTO: 0.08 K/UL (ref 0–0.2)
BASOPHILS NFR BLD: 1.3 % (ref 0–1.9)
DIFFERENTIAL METHOD: ABNORMAL
EOSINOPHIL # BLD AUTO: 0.2 K/UL (ref 0–0.5)
EOSINOPHIL NFR BLD: 2.6 % (ref 0–8)
ERYTHROCYTE [DISTWIDTH] IN BLOOD BY AUTOMATED COUNT: 17.3 % (ref 11.5–14.5)
FERRITIN SERPL-MCNC: 13 NG/ML (ref 20–300)
HCT VFR BLD AUTO: 29.8 % (ref 37–48.5)
HGB BLD-MCNC: 9 G/DL (ref 12–16)
IMM GRANULOCYTES # BLD AUTO: 0.02 K/UL (ref 0–0.04)
IMM GRANULOCYTES NFR BLD AUTO: 0.3 % (ref 0–0.5)
IRON SERPL-MCNC: 31 UG/DL (ref 30–160)
LYMPHOCYTES # BLD AUTO: 1.3 K/UL (ref 1–4.8)
LYMPHOCYTES NFR BLD: 21.2 % (ref 18–48)
MCH RBC QN AUTO: 30.4 PG (ref 27–31)
MCHC RBC AUTO-ENTMCNC: 30.2 G/DL (ref 32–36)
MCV RBC AUTO: 101 FL (ref 82–98)
MONOCYTES # BLD AUTO: 0.4 K/UL (ref 0.3–1)
MONOCYTES NFR BLD: 7 % (ref 4–15)
NEUTROPHILS # BLD AUTO: 4.2 K/UL (ref 1.8–7.7)
NEUTROPHILS NFR BLD: 67.6 % (ref 38–73)
NRBC BLD-RTO: 0 /100 WBC
PLATELET # BLD AUTO: 258 K/UL (ref 150–450)
PMV BLD AUTO: 12.3 FL (ref 9.2–12.9)
RBC # BLD AUTO: 2.96 M/UL (ref 4–5.4)
SATURATED IRON: 10 % (ref 20–50)
TOTAL IRON BINDING CAPACITY: 315 UG/DL (ref 250–450)
TRANSFERRIN SERPL-MCNC: 213 MG/DL (ref 200–375)
WBC # BLD AUTO: 6.14 K/UL (ref 3.9–12.7)

## 2022-01-15 NOTE — TELEPHONE ENCOUNTER
Patient called with decrease in Hb of 9 which is off of her most recent Hb of 11 mg/dl. She recently had a pacemaker placed at [facility] for irregular HR in November then was placed on eliquis in December for intermittent Afib?     Assessment:   67yrF with chronic GI blood loss anemia due to multiple angioectasias in the proximal small bowel now has exacerbation of anemia due to recurrent iron deficiency which may be worse on Eliquis     Plan:   Additional injectofer 750mg x two doses now then repeat every 6 months   Repeat CBC in 1 month   Discuss risks/benefits of changing eliquis to aspirin with cardiology     Sudheer Hagen MD

## 2022-01-18 ENCOUNTER — TELEPHONE (OUTPATIENT)
Dept: NEUROLOGY | Facility: HOSPITAL | Age: 67
End: 2022-01-18
Payer: MEDICARE

## 2022-01-18 DIAGNOSIS — D50.0 IRON DEFICIENCY ANEMIA DUE TO CHRONIC BLOOD LOSS: Primary | ICD-10-CM

## 2022-01-18 NOTE — TELEPHONE ENCOUNTER
Pt informed [facility] staff will contact to schedule iv iron.  CBC lab appt scheduled on Wednesday, February 16, 2022 at 1020am at [facility], with audio clinic follow up scheduled on Wednesday, February 23, 2022 at 145pm.  Pt repeated dates and times correctly.

## 2022-01-18 NOTE — TELEPHONE ENCOUNTER
----- Message from Sudheer Hagen MD sent at 1/15/2022  8:43 AM CST -----  Please set this patient up for two doses of injectofer with us over the next few weeks then repeat CBC and pyhone call visit in 4 weeks.   Thanks!

## 2022-01-21 ENCOUNTER — TELEPHONE (OUTPATIENT)
Dept: NEUROLOGY | Facility: HOSPITAL | Age: 67
End: 2022-01-21
Payer: MEDICARE

## 2022-01-21 NOTE — TELEPHONE ENCOUNTER
Pt requesting status of injectafar.  Pt notified pending approval, will be contacted with approval.

## 2022-01-21 NOTE — TELEPHONE ENCOUNTER
----- Message from Mickie Alberto sent at 1/21/2022  8:11 AM CST -----  Contact: [phone]/self  Type: Requesting to speak with nurse    Who Called: PT  Regarding: questions about iron infusion on appointment  Would the patient rather a call back or a response via [employer]? Call back  Best Call Back Number:[phone]  Additional Information:n/a

## 2022-01-25 ENCOUNTER — TELEPHONE (OUTPATIENT)
Dept: NEUROLOGY | Facility: HOSPITAL | Age: 67
End: 2022-01-25
Payer: MEDICARE

## 2022-01-25 RX ORDER — SODIUM CHLORIDE 0.9 % (FLUSH) 0.9 %
10 SYRINGE (ML) INJECTION
Status: CANCELLED
Start: 2022-01-25

## 2022-01-25 NOTE — TELEPHONE ENCOUNTER
----- Message from Naa Jacinto sent at 1/25/2022  1:01 PM CST -----  Contact: August () [phone]  Type: Requesting Call Back    Who called:  August   Blayne Call Back Number:  [phone]  Additional Information:   spoke to [employer] this morning, the prior authorization for the infusion was not received, please call to discuss

## 2022-02-02 ENCOUNTER — INFUSION (OUTPATIENT)
Dept: INFUSION THERAPY | Facility: HOSPITAL | Age: 67
End: 2022-02-02
Attending: INTERNAL MEDICINE
Payer: MEDICARE

## 2022-02-02 VITALS
OXYGEN SATURATION: 100 % | BODY MASS INDEX: 20.49 KG/M2 | HEIGHT: 62 IN | DIASTOLIC BLOOD PRESSURE: 68 MMHG | HEART RATE: 75 BPM | SYSTOLIC BLOOD PRESSURE: 116 MMHG | RESPIRATION RATE: 18 BRPM | TEMPERATURE: 98 F

## 2022-02-02 DIAGNOSIS — D50.0 IRON DEFICIENCY ANEMIA DUE TO CHRONIC BLOOD LOSS: Primary | ICD-10-CM

## 2022-02-02 PROCEDURE — 63600175 PHARM REV CODE 636 W HCPCS: Mod: JG | Performed by: INTERNAL MEDICINE

## 2022-02-02 PROCEDURE — 25000003 PHARM REV CODE 250: Performed by: INTERNAL MEDICINE

## 2022-02-02 PROCEDURE — 96365 THER/PROPH/DIAG IV INF INIT: CPT

## 2022-02-02 RX ADMIN — FERRIC CARBOXYMALTOSE INJECTION 750 MG: 50 INJECTION, SOLUTION INTRAVENOUS at 01:02

## 2022-02-02 RX ADMIN — SODIUM CHLORIDE: 0.9 INJECTION, SOLUTION INTRAVENOUS at 01:02

## 2022-02-02 NOTE — NURSING
Pt received IV Injectafer infusion dose 1/2. Pt tolerated it well. AVS given. Next appointment scheduled. Discharged with no acute distress.

## 2022-02-09 ENCOUNTER — INFUSION (OUTPATIENT)
Dept: INFUSION THERAPY | Facility: HOSPITAL | Age: 67
End: 2022-02-09
Attending: INTERNAL MEDICINE
Payer: MEDICARE

## 2022-02-09 VITALS
HEIGHT: 62 IN | SYSTOLIC BLOOD PRESSURE: 134 MMHG | DIASTOLIC BLOOD PRESSURE: 72 MMHG | BODY MASS INDEX: 20.49 KG/M2 | RESPIRATION RATE: 18 BRPM | HEART RATE: 94 BPM | OXYGEN SATURATION: 94 % | TEMPERATURE: 98 F

## 2022-02-09 DIAGNOSIS — D50.0 IRON DEFICIENCY ANEMIA DUE TO CHRONIC BLOOD LOSS: Primary | ICD-10-CM

## 2022-02-09 PROCEDURE — 25000003 PHARM REV CODE 250: Performed by: INTERNAL MEDICINE

## 2022-02-09 PROCEDURE — 96365 THER/PROPH/DIAG IV INF INIT: CPT

## 2022-02-09 PROCEDURE — 63600175 PHARM REV CODE 636 W HCPCS: Mod: JG | Performed by: INTERNAL MEDICINE

## 2022-02-09 RX ORDER — APIXABAN 2.5 MG/1
2.5 TABLET, FILM COATED ORAL 2 TIMES DAILY
COMMUNITY
Start: 2022-01-26

## 2022-02-09 RX ADMIN — SODIUM CHLORIDE: 0.9 INJECTION, SOLUTION INTRAVENOUS at 01:02

## 2022-02-09 RX ADMIN — FERRIC CARBOXYMALTOSE INJECTION 750 MG: 50 INJECTION, SOLUTION INTRAVENOUS at 01:02

## 2022-02-09 NOTE — NURSING
Pt received IV Injectafer infusion dose 2/2. Pt tolerated it well. AVS given. F/U with MD. Patient DC with no acute distress.

## 2022-02-16 ENCOUNTER — LAB VISIT (OUTPATIENT)
Dept: LAB | Facility: HOSPITAL | Age: 67
End: 2022-02-16
Attending: INTERNAL MEDICINE
Payer: MEDICARE

## 2022-02-16 DIAGNOSIS — D50.0 IRON DEFICIENCY ANEMIA DUE TO CHRONIC BLOOD LOSS: ICD-10-CM

## 2022-02-16 LAB
BASOPHILS # BLD AUTO: 0.06 K/UL (ref 0–0.2)
BASOPHILS NFR BLD: 1.2 % (ref 0–1.9)
DIFFERENTIAL METHOD: ABNORMAL
EOSINOPHIL # BLD AUTO: 0.1 K/UL (ref 0–0.5)
EOSINOPHIL NFR BLD: 1 % (ref 0–8)
ERYTHROCYTE [DISTWIDTH] IN BLOOD BY AUTOMATED COUNT: 22.5 % (ref 11.5–14.5)
HCT VFR BLD AUTO: 39.5 % (ref 37–48.5)
HGB BLD-MCNC: 11.2 G/DL (ref 12–16)
IMM GRANULOCYTES # BLD AUTO: 0 K/UL (ref 0–0.04)
IMM GRANULOCYTES NFR BLD AUTO: 0 % (ref 0–0.5)
LYMPHOCYTES # BLD AUTO: 1.4 K/UL (ref 1–4.8)
LYMPHOCYTES NFR BLD: 27.6 % (ref 18–48)
MCH RBC QN AUTO: 30.3 PG (ref 27–31)
MCHC RBC AUTO-ENTMCNC: 28.4 G/DL (ref 32–36)
MCV RBC AUTO: 107 FL (ref 82–98)
MONOCYTES # BLD AUTO: 0.5 K/UL (ref 0.3–1)
MONOCYTES NFR BLD: 9.4 % (ref 4–15)
NEUTROPHILS # BLD AUTO: 3.2 K/UL (ref 1.8–7.7)
NEUTROPHILS NFR BLD: 60.8 % (ref 38–73)
NRBC BLD-RTO: 0 /100 WBC
PLATELET # BLD AUTO: 143 K/UL (ref 150–450)
PMV BLD AUTO: 12.9 FL (ref 9.2–12.9)
RBC # BLD AUTO: 3.7 M/UL (ref 4–5.4)
WBC # BLD AUTO: 5.19 K/UL (ref 3.9–12.7)

## 2022-02-16 PROCEDURE — 36415 COLL VENOUS BLD VENIPUNCTURE: CPT | Performed by: INTERNAL MEDICINE

## 2022-02-16 PROCEDURE — 85025 COMPLETE CBC W/AUTO DIFF WBC: CPT | Performed by: INTERNAL MEDICINE

## 2022-02-17 ENCOUNTER — TELEPHONE (OUTPATIENT)
Dept: NEUROLOGY | Facility: HOSPITAL | Age: 67
End: 2022-02-17
Payer: MEDICARE

## 2022-02-17 NOTE — TELEPHONE ENCOUNTER
Audio appt scheduled with pt on Wednesday, February 23, 2022 at 145pm to review lab results and additional treatment.

## 2022-02-17 NOTE — TELEPHONE ENCOUNTER
----- Message from Sudheer Hagen MD sent at 2/17/2022 10:37 AM CST -----  Please set up patient for an audio visit next week to review lab results and discuss if she needs any additional treatment

## 2022-02-23 ENCOUNTER — OFFICE VISIT (OUTPATIENT)
Dept: NEUROLOGY | Facility: HOSPITAL | Age: 67
End: 2022-02-23
Attending: INTERNAL MEDICINE
Payer: MEDICARE

## 2022-02-23 DIAGNOSIS — D50.0 ANEMIA DUE TO CHRONIC BLOOD LOSS: Primary | ICD-10-CM

## 2022-02-23 NOTE — PROGRESS NOTES
Established Patient - Audio Only Telehealth Visit     The patient location is: Home   The chief complaint leading to consultation is: anemia   Visit type: Virtual visit with audio only (telephone)  Total time spent with patient: 10 minutes        The reason for the audio only service rather than synchronous audio and video virtual visit was related to technical difficulties or patient preference/necessity.     Each patient to whom I provide medical services by telemedicine is:  (1) informed of the relationship between the physician and patient and the respective role of any other health care provider with respect to management of the patient; and (2) notified that they may decline to receive medical services by telemedicine and may withdraw from such care at any time. Patient verbally consented to receive this service via voice-only telephone call.       HPI: Patient called with improvement in her H/H to almost normal following parenteral iron. She continues her eliquis therapy for intermittent Afib but denies gross rectal bleeding or melena.    Lab Results   Component Value Date    WBC 5.19 02/16/2022    HGB 11.2 (L) 02/16/2022    HCT 39.5 02/16/2022     (H) 02/16/2022     (L) 02/16/2022     Assessment:   67yrF with chronic GI blood loss anemia due to multiple angioectasias in the proximal small bowel now has exacerbation of anemia due to recurrent iron deficiency was somewhat worse on eliquis but is now significantly improved after two doses of parenteral iron     Plan:   Oral iron once daily   Plan parenteral iron every 6 months as needed   Discuss risks/benefits of changing eliquis to aspirin with cardiology if H/H drops   Repeat CBC in 1 month then repeat CBC and iron studies 3 months later      Sudheer Hagen MD        This service was not originating from a related E/M service provided within the previous 7 days nor will  to an E/M service or procedure within the next 24 hours or my  soonest available appointment.  Prevailing standard of care was able to be met in this audio-only visit.

## 2022-02-24 ENCOUNTER — TELEPHONE (OUTPATIENT)
Dept: NEUROLOGY | Facility: HOSPITAL | Age: 67
End: 2022-02-24
Payer: MEDICARE

## 2022-02-24 DIAGNOSIS — D50.0 ANEMIA DUE TO CHRONIC BLOOD LOSS: Primary | ICD-10-CM

## 2022-02-24 NOTE — PATIENT INSTRUCTIONS
CBC in 1 month, scheduled on 3/24/22 at 1025am at [facility].    Labs 3 months later, pt to contact clinic to schedule.    Continue oral iron daily.    IV iron infusion every 6 months as needed.

## 2022-02-24 NOTE — TELEPHONE ENCOUNTER
Lab appt scheduled with pt on Thursday, March 24, 2022 at 1025am at [facility] lab. Future labs cbc and iron studies 3 months later. Pt instructed to continue oral iron daily , iv iron infusions as needed every 6 months. Pt acknowledged undersatnding.

## 2022-03-23 ENCOUNTER — PATIENT MESSAGE (OUTPATIENT)
Dept: RESEARCH | Facility: HOSPITAL | Age: 67
End: 2022-03-23
Payer: MEDICARE

## 2022-03-24 ENCOUNTER — LAB VISIT (OUTPATIENT)
Dept: LAB | Facility: HOSPITAL | Age: 67
End: 2022-03-24
Attending: INTERNAL MEDICINE
Payer: MEDICARE

## 2022-03-24 DIAGNOSIS — D50.0 ANEMIA DUE TO CHRONIC BLOOD LOSS: ICD-10-CM

## 2022-03-24 LAB
BASOPHILS # BLD AUTO: 0.05 K/UL (ref 0–0.2)
BASOPHILS NFR BLD: 1.2 % (ref 0–1.9)
DIFFERENTIAL METHOD: ABNORMAL
EOSINOPHIL # BLD AUTO: 0.2 K/UL (ref 0–0.5)
EOSINOPHIL NFR BLD: 4.2 % (ref 0–8)
ERYTHROCYTE [DISTWIDTH] IN BLOOD BY AUTOMATED COUNT: 17.2 % (ref 11.5–14.5)
HCT VFR BLD AUTO: 34.5 % (ref 37–48.5)
HGB BLD-MCNC: 10.5 G/DL (ref 12–16)
IMM GRANULOCYTES # BLD AUTO: 0.01 K/UL (ref 0–0.04)
IMM GRANULOCYTES NFR BLD AUTO: 0.2 % (ref 0–0.5)
LYMPHOCYTES # BLD AUTO: 1.2 K/UL (ref 1–4.8)
LYMPHOCYTES NFR BLD: 29.1 % (ref 18–48)
MCH RBC QN AUTO: 33.3 PG (ref 27–31)
MCHC RBC AUTO-ENTMCNC: 30.4 G/DL (ref 32–36)
MCV RBC AUTO: 110 FL (ref 82–98)
MONOCYTES # BLD AUTO: 0.3 K/UL (ref 0.3–1)
MONOCYTES NFR BLD: 8.4 % (ref 4–15)
NEUTROPHILS # BLD AUTO: 2.3 K/UL (ref 1.8–7.7)
NEUTROPHILS NFR BLD: 56.9 % (ref 38–73)
NRBC BLD-RTO: 0 /100 WBC
PLATELET # BLD AUTO: 181 K/UL (ref 150–450)
PMV BLD AUTO: 12.4 FL (ref 9.2–12.9)
RBC # BLD AUTO: 3.15 M/UL (ref 4–5.4)
WBC # BLD AUTO: 4.05 K/UL (ref 3.9–12.7)

## 2022-03-24 PROCEDURE — 36415 COLL VENOUS BLD VENIPUNCTURE: CPT | Performed by: INTERNAL MEDICINE

## 2022-03-24 PROCEDURE — 85025 COMPLETE CBC W/AUTO DIFF WBC: CPT | Performed by: INTERNAL MEDICINE

## 2022-03-25 ENCOUNTER — PATIENT MESSAGE (OUTPATIENT)
Dept: NEUROLOGY | Facility: HOSPITAL | Age: 67
End: 2022-03-25
Payer: MEDICARE

## 2022-03-30 ENCOUNTER — OFFICE VISIT (OUTPATIENT)
Dept: NEUROLOGY | Facility: HOSPITAL | Age: 67
End: 2022-03-30
Attending: INTERNAL MEDICINE
Payer: MEDICARE

## 2022-03-30 ENCOUNTER — PATIENT MESSAGE (OUTPATIENT)
Dept: NEUROLOGY | Facility: HOSPITAL | Age: 67
End: 2022-03-30

## 2022-03-30 ENCOUNTER — TELEPHONE (OUTPATIENT)
Dept: NEUROLOGY | Facility: HOSPITAL | Age: 67
End: 2022-03-30
Payer: MEDICARE

## 2022-03-30 DIAGNOSIS — D50.9 IRON DEFICIENCY ANEMIA, UNSPECIFIED IRON DEFICIENCY ANEMIA TYPE: Primary | ICD-10-CM

## 2022-03-30 NOTE — TELEPHONE ENCOUNTER
Pt with questions of visit earlier today.  Not taking iron, instructed pt to take otc iron daily.  3 month lab appointment scheduled with pt on Tuesday, June 28, 2022 at 940am at [facility] lab in [place].

## 2022-03-30 NOTE — TELEPHONE ENCOUNTER
----- Message from Kerri De La Cruz sent at 3/30/2022 12:38 PM CDT -----  Contact: Pgadib-[phone]  Type:  Needs Medical Advice    Who Called: Pt  Reason for call: regarding pt spoke with a Associate of Dr Hagen and he told the pt she needs to stay on Iron Pills for the rest of her Life, pt states she is not on any Iron pills  Would the patient rather a call back or a response via [facility]?  Call back  Best Call Back Number: [phone]

## 2022-03-30 NOTE — PROGRESS NOTES
Established Patient - Audio Only Telehealth Visit     The patient location is: home  The chief complaint leading to consultation is: anemia  Visit type: Virtual visit with audio only (telephone)  Total time spent with patient and reviewing chart: >20 minutes       The reason for the audio only service rather than synchronous audio and video virtual visit was related to technical difficulties or patient preference/necessity.     Each patient to whom I provide medical services by telemedicine is:  (1) informed of the relationship between the physician and patient and the respective role of any other health care provider with respect to management of the patient; and (2) notified that they may decline to receive medical services by telemedicine and may withdraw from such care at any time. Patient verbally consented to receive this service via voice-only telephone call.       HPI: 67F with PMH significant for Afib (on eliquis) and BRITTANY from angioectasias in proximal small bowel here for follow up. Last IV iron dose in February. Has not had any PRBC transfusions since our last visit. Denies any melena, fatigue, SOB, RUIZ, lightheadedness, or hematochezia since our last visit. Is taking her oral iron and eliquis every day.     Assessment and plan:  1. BRITTANY  2. Angioectasias    -will check CBC, ferritin, iron panel 3 months from today (6/30)  -continue oral daily iron indefinitely  -responded well to IV iron therapy in past     This service was not originating from a related E/M service provided within the previous 7 days nor will  to an E/M service or procedure within the next 24 hours or my soonest available appointment.  Prevailing standard of care was able to be met in this audio-only visit.

## 2022-03-30 NOTE — TELEPHONE ENCOUNTER
----- Message from Jose Pires sent at 3/30/2022  1:05 PM CDT -----  Contact: [phone]/august  Who Called: PT  august   Regarding: pt needs to schedule appt   Would the patient rather a call back or a response via [employer]? Call back  Best Call Back Number: [phone]  Additional Information: n/a

## 2022-06-28 ENCOUNTER — LAB VISIT (OUTPATIENT)
Dept: LAB | Facility: HOSPITAL | Age: 67
End: 2022-06-28
Attending: INTERNAL MEDICINE
Payer: MEDICARE

## 2022-06-28 DIAGNOSIS — D50.9 IRON DEFICIENCY ANEMIA, UNSPECIFIED IRON DEFICIENCY ANEMIA TYPE: ICD-10-CM

## 2022-06-28 LAB
ERYTHROCYTE [DISTWIDTH] IN BLOOD BY AUTOMATED COUNT: 12.6 % (ref 11.5–14.5)
FERRITIN SERPL-MCNC: 78 NG/ML (ref 20–300)
HCT VFR BLD AUTO: 40.6 % (ref 37–48.5)
HGB BLD-MCNC: 13 G/DL (ref 12–16)
IRON SERPL-MCNC: 214 UG/DL (ref 30–160)
MCH RBC QN AUTO: 35.1 PG (ref 27–31)
MCHC RBC AUTO-ENTMCNC: 32 G/DL (ref 32–36)
MCV RBC AUTO: 110 FL (ref 82–98)
PLATELET # BLD AUTO: 179 K/UL (ref 150–450)
PMV BLD AUTO: 12.4 FL (ref 9.2–12.9)
RBC # BLD AUTO: 3.7 M/UL (ref 4–5.4)
SATURATED IRON: 88 % (ref 20–50)
TOTAL IRON BINDING CAPACITY: 243 UG/DL (ref 250–450)
TRANSFERRIN SERPL-MCNC: 164 MG/DL (ref 200–375)
WBC # BLD AUTO: 5.91 K/UL (ref 3.9–12.7)

## 2022-06-28 PROCEDURE — 82728 ASSAY OF FERRITIN: CPT | Performed by: INTERNAL MEDICINE

## 2022-06-28 PROCEDURE — 85027 COMPLETE CBC AUTOMATED: CPT | Performed by: INTERNAL MEDICINE

## 2022-06-28 PROCEDURE — 36415 COLL VENOUS BLD VENIPUNCTURE: CPT | Performed by: INTERNAL MEDICINE

## 2022-06-28 PROCEDURE — 84466 ASSAY OF TRANSFERRIN: CPT | Performed by: INTERNAL MEDICINE

## 2022-07-06 ENCOUNTER — DOCUMENTATION ONLY (OUTPATIENT)
Dept: NEUROLOGY | Facility: HOSPITAL | Age: 67
End: 2022-07-06
Payer: MEDICARE

## 2022-07-06 NOTE — PROGRESS NOTES
Lab Results    Spoke with Mrs. Ahmadi regarding her recent lab results and discussed continuation of her iron supplementation.

## 2022-07-25 ENCOUNTER — PATIENT MESSAGE (OUTPATIENT)
Dept: NEUROLOGY | Facility: HOSPITAL | Age: 67
End: 2022-07-25
Payer: MEDICARE

## 2022-11-29 ENCOUNTER — PATIENT MESSAGE (OUTPATIENT)
Dept: NEUROLOGY | Facility: HOSPITAL | Age: 67
End: 2022-11-29
Payer: MEDICARE

## 2022-11-30 DIAGNOSIS — D50.0 IRON DEFICIENCY ANEMIA DUE TO CHRONIC BLOOD LOSS: Primary | ICD-10-CM

## 2022-12-02 ENCOUNTER — LAB VISIT (OUTPATIENT)
Dept: LAB | Facility: HOSPITAL | Age: 67
End: 2022-12-02
Attending: EMERGENCY MEDICINE
Payer: MEDICARE

## 2022-12-02 DIAGNOSIS — D50.0 IRON DEFICIENCY ANEMIA DUE TO CHRONIC BLOOD LOSS: ICD-10-CM

## 2022-12-02 PROCEDURE — 82728 ASSAY OF FERRITIN: CPT | Performed by: INTERNAL MEDICINE

## 2022-12-02 PROCEDURE — 84466 ASSAY OF TRANSFERRIN: CPT | Performed by: INTERNAL MEDICINE

## 2022-12-02 PROCEDURE — 85025 COMPLETE CBC W/AUTO DIFF WBC: CPT | Performed by: INTERNAL MEDICINE

## 2022-12-02 PROCEDURE — 36415 COLL VENOUS BLD VENIPUNCTURE: CPT | Performed by: INTERNAL MEDICINE

## 2022-12-03 LAB
BASOPHILS # BLD AUTO: 0.05 K/UL (ref 0–0.2)
BASOPHILS NFR BLD: 0.6 % (ref 0–1.9)
DIFFERENTIAL METHOD: ABNORMAL
EOSINOPHIL # BLD AUTO: 0.2 K/UL (ref 0–0.5)
EOSINOPHIL NFR BLD: 2.9 % (ref 0–8)
ERYTHROCYTE [DISTWIDTH] IN BLOOD BY AUTOMATED COUNT: 12.8 % (ref 11.5–14.5)
FERRITIN SERPL-MCNC: 80 NG/ML (ref 20–300)
HCT VFR BLD AUTO: 42 % (ref 37–48.5)
HGB BLD-MCNC: 13.4 G/DL (ref 12–16)
IMM GRANULOCYTES # BLD AUTO: 0.03 K/UL (ref 0–0.04)
IMM GRANULOCYTES NFR BLD AUTO: 0.4 % (ref 0–0.5)
IRON SERPL-MCNC: 173 UG/DL (ref 30–160)
LYMPHOCYTES # BLD AUTO: 1.6 K/UL (ref 1–4.8)
LYMPHOCYTES NFR BLD: 20.4 % (ref 18–48)
MCH RBC QN AUTO: 34.7 PG (ref 27–31)
MCHC RBC AUTO-ENTMCNC: 31.9 G/DL (ref 32–36)
MCV RBC AUTO: 109 FL (ref 82–98)
MONOCYTES # BLD AUTO: 0.5 K/UL (ref 0.3–1)
MONOCYTES NFR BLD: 6.7 % (ref 4–15)
NEUTROPHILS # BLD AUTO: 5.4 K/UL (ref 1.8–7.7)
NEUTROPHILS NFR BLD: 69 % (ref 38–73)
NRBC BLD-RTO: 0 /100 WBC
PLATELET # BLD AUTO: 194 K/UL (ref 150–450)
PMV BLD AUTO: 12.2 FL (ref 9.2–12.9)
RBC # BLD AUTO: 3.86 M/UL (ref 4–5.4)
SATURATED IRON: 73 % (ref 20–50)
TOTAL IRON BINDING CAPACITY: 237 UG/DL (ref 250–450)
TRANSFERRIN SERPL-MCNC: 160 MG/DL (ref 200–375)
WBC # BLD AUTO: 7.8 K/UL (ref 3.9–12.7)

## 2022-12-08 ENCOUNTER — TELEPHONE (OUTPATIENT)
Dept: NEUROLOGY | Facility: HOSPITAL | Age: 67
End: 2022-12-08
Payer: MEDICARE

## 2022-12-08 NOTE — TELEPHONE ENCOUNTER
Spoke with Mrs. Ahmadi about recent blood work and resolution of anemia. Discussed continue need for iron supplementation with either MVI with iron or iron pills. Additionally, patient will return to clinic should she her anemia return.

## 2024-06-10 ENCOUNTER — TELEPHONE (OUTPATIENT)
Dept: GASTROENTEROLOGY | Facility: CLINIC | Age: 69
End: 2024-06-10
Payer: MEDICARE

## 2024-06-10 NOTE — TELEPHONE ENCOUNTER
----- Message from Carla Lerma sent at 6/10/2024  8:28 AM CDT -----  Type:  Same Day Appointment Request    Caller is requesting a same day appointment.  Caller declined first available appointment listed below.    Name of Caller: Pt   August  When is the first available appointment? N/A  Symptoms: Losing weight /dizzy/not eating/low blood counts  Best Call Back Number: [phone]  Additional Information:

## 2024-06-10 NOTE — TELEPHONE ENCOUNTER
"August, , "she is losing wt/dizzy/not eating and blood counts low".  Offered to schedule gi clinic appt today, 6/10/24,  replied "we can't come today, I have to wait on my prescriptions".  Appt scheduled on Wednesday, June 12, 2024 at 930am, clinic address given and repeated correctly. Advised if symptoms worsen to go to ED.   "

## 2024-06-12 ENCOUNTER — LAB VISIT (OUTPATIENT)
Dept: LAB | Facility: HOSPITAL | Age: 69
End: 2024-06-12
Payer: MEDICARE

## 2024-06-12 ENCOUNTER — OFFICE VISIT (OUTPATIENT)
Dept: GASTROENTEROLOGY | Facility: CLINIC | Age: 69
End: 2024-06-12
Payer: MEDICARE

## 2024-06-12 VITALS
DIASTOLIC BLOOD PRESSURE: 60 MMHG | SYSTOLIC BLOOD PRESSURE: 95 MMHG | WEIGHT: 102.63 LBS | HEART RATE: 52 BPM | BODY MASS INDEX: 18.89 KG/M2 | HEIGHT: 62 IN

## 2024-06-12 DIAGNOSIS — R26.89 RAPID FATIGUE OF GAIT: ICD-10-CM

## 2024-06-12 DIAGNOSIS — R53.83 FATIGUE: ICD-10-CM

## 2024-06-12 DIAGNOSIS — D62 ACUTE BLOOD LOSS ANEMIA: Primary | ICD-10-CM

## 2024-06-12 DIAGNOSIS — D62 ACUTE BLOOD LOSS ANEMIA: ICD-10-CM

## 2024-06-12 LAB
FERRITIN SERPL-MCNC: 137 NG/ML (ref 20–300)
IRON SERPL-MCNC: 219 UG/DL (ref 30–160)
SATURATED IRON: 95 % (ref 20–50)
TOTAL IRON BINDING CAPACITY: 231 UG/DL (ref 250–450)
TRANSFERRIN SERPL-MCNC: 156 MG/DL (ref 200–375)
TSH SERPL DL<=0.005 MIU/L-ACNC: 0.72 UIU/ML (ref 0.4–4)
VIT B12 SERPL-MCNC: 528 PG/ML (ref 210–950)

## 2024-06-12 PROCEDURE — 3008F BODY MASS INDEX DOCD: CPT | Mod: CPTII,S$GLB,, | Performed by: INTERNAL MEDICINE

## 2024-06-12 PROCEDURE — 83540 ASSAY OF IRON: CPT | Performed by: INTERNAL MEDICINE

## 2024-06-12 PROCEDURE — 3074F SYST BP LT 130 MM HG: CPT | Mod: CPTII,S$GLB,, | Performed by: INTERNAL MEDICINE

## 2024-06-12 PROCEDURE — 3288F FALL RISK ASSESSMENT DOCD: CPT | Mod: CPTII,S$GLB,, | Performed by: INTERNAL MEDICINE

## 2024-06-12 PROCEDURE — 1101F PT FALLS ASSESS-DOCD LE1/YR: CPT | Mod: CPTII,S$GLB,, | Performed by: INTERNAL MEDICINE

## 2024-06-12 PROCEDURE — 82607 VITAMIN B-12: CPT | Performed by: INTERNAL MEDICINE

## 2024-06-12 PROCEDURE — 99214 OFFICE O/P EST MOD 30 MIN: CPT | Mod: S$GLB,,, | Performed by: INTERNAL MEDICINE

## 2024-06-12 PROCEDURE — 82728 ASSAY OF FERRITIN: CPT | Performed by: INTERNAL MEDICINE

## 2024-06-12 PROCEDURE — 36415 COLL VENOUS BLD VENIPUNCTURE: CPT | Performed by: INTERNAL MEDICINE

## 2024-06-12 PROCEDURE — 1125F AMNT PAIN NOTED PAIN PRSNT: CPT | Mod: CPTII,S$GLB,, | Performed by: INTERNAL MEDICINE

## 2024-06-12 PROCEDURE — 1159F MED LIST DOCD IN RCRD: CPT | Mod: CPTII,S$GLB,, | Performed by: INTERNAL MEDICINE

## 2024-06-12 PROCEDURE — 99999 PR PBB SHADOW E&M-EST. PATIENT-LVL III: CPT | Mod: PBBFAC,,, | Performed by: INTERNAL MEDICINE

## 2024-06-12 PROCEDURE — 3078F DIAST BP <80 MM HG: CPT | Mod: CPTII,S$GLB,, | Performed by: INTERNAL MEDICINE

## 2024-06-12 PROCEDURE — 84443 ASSAY THYROID STIM HORMONE: CPT | Performed by: INTERNAL MEDICINE

## 2024-06-12 RX ORDER — METOPROLOL TARTRATE 25 MG/1
25 TABLET, FILM COATED ORAL 2 TIMES DAILY
COMMUNITY

## 2024-06-12 NOTE — PROGRESS NOTES
"[facility]    CC: BRITTANY    HPI 69 y.o. female  with PMH significant for Afib and BRITTANY from angioectasias in proximal small bowel here for follow up.     Today, reports most bothersome symptom for her were weakness and dizziness. This has since resolved. Has lost 10 lbs over past year. Denies overt GI bleeding, NSAID use, n/v, diarrhea, hx of nosebleeds, constipation, or dysphagia. Tolerating eliquis without issues.    Endo Hx:  Upper DBE 2020 - 2 classic angioectasias in jejunum s/p APC    Physical Examination  BP 95/60 (BP Location: Left arm, Patient Position: Sitting, BP Method: Medium (Automatic))   Pulse (!) 52   Ht 5' 2" (1.575 m)   Wt 46.6 kg (102 lb 10 oz)   BMI 18.77 kg/m²   General appearance: alert, cooperative, no distress  Abdomen: soft, non-tender; bowel sounds normoactive; no organomegaly  Lab Results   Component Value Date    WBC 7.80 12/02/2022    HGB 13.4 12/02/2022    HCT 42.0 12/02/2022     (H) 12/02/2022     12/02/2022       Assessment:   69 y.o.female with PMH significant for Afib (on eliquis), CCY, tobacco use, 2nd degree AV block s/p pacemaker and BRITTANY from angioectasias in proximal small bowel here for follow up. Her primary complaint today is recent fatigue and mild weight loss which have now improved somewhat since scheduling her appointment. In the past, she recalls improvement in fatigue with IV iron therapy for anemia. However, lab draw today shows a normal hemoglobin and excess iron stores    Plan:  Supportive care/observation for chronic anemia which is currently resolved   If symptoms of fatigue persist or worsen, plan re-evaluation by PCP as these do not appear to be related to a GI condition. She is currently eating normally without any GI symptoms   Reassurance     Total encounter time >45 min including all elements of care   Sudheer Hagen MD   [address] [place] [address], [address]  [place] [address] [phone]    "

## 2025-09-03 ENCOUNTER — HOSPITAL ENCOUNTER (OUTPATIENT)
Dept: WOUND CARE | Facility: HOSPITAL | Age: 70
Discharge: HOME | End: 2025-09-03
Attending: SURGERY
Payer: MEDICARE

## 2025-09-03 VITALS
BODY MASS INDEX: 18.91 KG/M2 | WEIGHT: 102.75 LBS | TEMPERATURE: 98 F | HEIGHT: 62 IN | DIASTOLIC BLOOD PRESSURE: 71 MMHG | SYSTOLIC BLOOD PRESSURE: 110 MMHG | HEART RATE: 84 BPM

## 2025-09-03 DIAGNOSIS — D64.9 ANEMIA, UNSPECIFIED TYPE: ICD-10-CM

## 2025-09-03 DIAGNOSIS — T14.8XXD DELAYED HEALING OF TRAUMATIC WOUND: Primary | ICD-10-CM

## 2025-09-03 DIAGNOSIS — D50.0 IRON DEFICIENCY ANEMIA DUE TO CHRONIC BLOOD LOSS: ICD-10-CM

## 2025-09-03 DIAGNOSIS — D50.0 CHRONIC BLOOD LOSS ANEMIA: ICD-10-CM

## 2025-09-03 PROCEDURE — 11042 DBRDMT SUBQ TIS 1ST 20SQCM/<: CPT

## 2025-09-03 PROCEDURE — 87075 CULTR BACTERIA EXCEPT BLOOD: CPT | Performed by: SURGERY

## 2025-09-03 PROCEDURE — 87070 CULTURE OTHR SPECIMN AEROBIC: CPT | Performed by: SURGERY

## 2025-09-03 PROCEDURE — 99203 OFFICE O/P NEW LOW 30 MIN: CPT

## 2025-09-03 RX ORDER — MUPIROCIN 20 MG/G
2 OINTMENT TOPICAL DAILY
COMMUNITY

## 2025-09-05 LAB — BACTERIA SPEC ANAEROBE CULT: NORMAL

## 2025-09-06 LAB — BACTERIA SPEC AEROBE CULT: NORMAL
